# Patient Record
Sex: FEMALE | Race: WHITE | Employment: OTHER | URBAN - METROPOLITAN AREA
[De-identification: names, ages, dates, MRNs, and addresses within clinical notes are randomized per-mention and may not be internally consistent; named-entity substitution may affect disease eponyms.]

---

## 2017-06-29 ENCOUNTER — OFFICE VISIT (OUTPATIENT)
Dept: SURGERY | Age: 32
End: 2017-06-29

## 2017-06-29 VITALS
SYSTOLIC BLOOD PRESSURE: 102 MMHG | WEIGHT: 223 LBS | HEART RATE: 64 BPM | HEIGHT: 65 IN | BODY MASS INDEX: 37.15 KG/M2 | DIASTOLIC BLOOD PRESSURE: 72 MMHG

## 2017-06-29 DIAGNOSIS — E66.09 NON MORBID OBESITY DUE TO EXCESS CALORIES: Primary | ICD-10-CM

## 2017-06-29 DIAGNOSIS — E04.9 GOITER: ICD-10-CM

## 2017-06-29 DIAGNOSIS — G47.33 OBSTRUCTIVE SLEEP APNEA SYNDROME: ICD-10-CM

## 2017-06-29 DIAGNOSIS — E66.09 NON MORBID OBESITY DUE TO EXCESS CALORIES: ICD-10-CM

## 2017-06-29 DIAGNOSIS — E88.81 INSULIN RESISTANCE: ICD-10-CM

## 2017-06-29 DIAGNOSIS — R13.10 DYSPHAGIA, UNSPECIFIED TYPE: ICD-10-CM

## 2017-06-29 LAB
HBA1C MFR BLD: 5.2 % (ref 4.8–5.9)
T4 FREE: 0.78 NG/DL (ref 0.93–1.7)
TSH SERPL DL<=0.05 MIU/L-ACNC: 1.1 UIU/ML (ref 0.27–4.2)

## 2017-06-29 PROCEDURE — 99203 OFFICE O/P NEW LOW 30 MIN: CPT | Performed by: INTERNAL MEDICINE

## 2017-06-29 RX ORDER — FLUOXETINE HYDROCHLORIDE 20 MG/1
60 CAPSULE ORAL DAILY
COMMUNITY
End: 2021-08-09 | Stop reason: SDUPTHER

## 2017-06-29 RX ORDER — PREGABALIN 200 MG/1
200 CAPSULE ORAL 3 TIMES DAILY
COMMUNITY
End: 2021-08-09

## 2017-06-29 RX ORDER — TOPIRAMATE 100 MG/1
100 TABLET, FILM COATED ORAL 2 TIMES DAILY
COMMUNITY
End: 2021-08-09 | Stop reason: SDUPTHER

## 2017-06-29 RX ORDER — GABAPENTIN 800 MG/1
800 TABLET ORAL 4 TIMES DAILY
COMMUNITY
End: 2021-08-09 | Stop reason: SDUPTHER

## 2017-07-01 LAB — THYROID PEROXIDASE (TPO) ABS: 0.6 IU/ML (ref 0–9)

## 2017-07-03 ENCOUNTER — TELEPHONE (OUTPATIENT)
Dept: SURGERY | Age: 32
End: 2017-07-03

## 2017-07-04 ASSESSMENT — ENCOUNTER SYMPTOMS
SWOLLEN GLANDS: 0
RESPIRATORY NEGATIVE: 1
SORE THROAT: 0
EYES NEGATIVE: 1
TROUBLE SWALLOWING: 1

## 2017-07-05 ENCOUNTER — TELEPHONE (OUTPATIENT)
Dept: SURGERY | Age: 32
End: 2017-07-05

## 2017-07-07 ENCOUNTER — HOSPITAL ENCOUNTER (OUTPATIENT)
Dept: ULTRASOUND IMAGING | Age: 32
Discharge: HOME | End: 2017-07-07
Payer: COMMERCIAL

## 2017-07-07 DIAGNOSIS — E04.9 GOITER: ICD-10-CM

## 2017-07-07 PROCEDURE — 76536 US EXAM OF HEAD AND NECK: CPT

## 2017-08-26 ENCOUNTER — HOSPITAL ENCOUNTER (EMERGENCY)
Age: 32
Discharge: HOME | End: 2017-08-26
Attending: EMERGENCY MEDICINE
Payer: COMMERCIAL

## 2017-08-26 VITALS
WEIGHT: 205 LBS | DIASTOLIC BLOOD PRESSURE: 62 MMHG | RESPIRATION RATE: 18 BRPM | HEIGHT: 63 IN | TEMPERATURE: 98 F | OXYGEN SATURATION: 97 % | HEART RATE: 83 BPM | SYSTOLIC BLOOD PRESSURE: 112 MMHG | BODY MASS INDEX: 36.32 KG/M2

## 2017-08-26 DIAGNOSIS — T78.40XA ALLERGIC REACTION, INITIAL ENCOUNTER: Primary | ICD-10-CM

## 2017-08-26 PROCEDURE — 96374 THER/PROPH/DIAG INJ IV PUSH: CPT

## 2017-08-26 PROCEDURE — S0028 INJECTION, FAMOTIDINE, 20 MG: HCPCS

## 2017-08-26 PROCEDURE — 96375 TX/PRO/DX INJ NEW DRUG ADDON: CPT

## 2017-08-26 PROCEDURE — 2500000003 HC RX 250 WO HCPCS

## 2017-08-26 PROCEDURE — 99282 EMERGENCY DEPT VISIT SF MDM: CPT

## 2017-08-26 PROCEDURE — 6360000002 HC RX W HCPCS

## 2017-08-26 PROCEDURE — 2580000003 HC RX 258

## 2017-08-26 RX ORDER — DIPHENHYDRAMINE HCL 25 MG
25 CAPSULE ORAL EVERY 4 HOURS PRN
Qty: 1 CAPSULE | Refills: 0 | Status: SHIPPED | OUTPATIENT
Start: 2017-08-26 | End: 2017-09-05

## 2017-08-26 RX ORDER — DIPHENHYDRAMINE HYDROCHLORIDE 50 MG/ML
25 INJECTION INTRAMUSCULAR; INTRAVENOUS ONCE
Status: COMPLETED | OUTPATIENT
Start: 2017-08-26 | End: 2017-08-26

## 2017-08-26 RX ORDER — METHYLPREDNISOLONE 4 MG/1
TABLET ORAL
Qty: 1 KIT | Refills: 0 | Status: SHIPPED | OUTPATIENT
Start: 2017-08-26 | End: 2017-09-01

## 2017-08-26 RX ORDER — SODIUM CHLORIDE 9 MG/ML
INJECTION, SOLUTION INTRAVENOUS
Status: COMPLETED
Start: 2017-08-26 | End: 2017-08-26

## 2017-08-26 RX ORDER — FAMOTIDINE 20 MG/1
20 TABLET, FILM COATED ORAL 2 TIMES DAILY
Qty: 20 TABLET | Refills: 0 | Status: SHIPPED | OUTPATIENT
Start: 2017-08-26 | End: 2021-08-09 | Stop reason: SDUPTHER

## 2017-08-26 RX ORDER — DIPHENHYDRAMINE HYDROCHLORIDE 50 MG/ML
INJECTION INTRAMUSCULAR; INTRAVENOUS
Status: COMPLETED
Start: 2017-08-26 | End: 2017-08-26

## 2017-08-26 RX ORDER — METHYLPREDNISOLONE SODIUM SUCCINATE 125 MG/2ML
INJECTION, POWDER, LYOPHILIZED, FOR SOLUTION INTRAMUSCULAR; INTRAVENOUS
Status: COMPLETED
Start: 2017-08-26 | End: 2017-08-26

## 2017-08-26 RX ORDER — METHYLPREDNISOLONE SODIUM SUCCINATE 125 MG/2ML
125 INJECTION, POWDER, LYOPHILIZED, FOR SOLUTION INTRAMUSCULAR; INTRAVENOUS ONCE
Status: COMPLETED | OUTPATIENT
Start: 2017-08-26 | End: 2017-08-26

## 2017-08-26 RX ADMIN — METHYLPREDNISOLONE SODIUM SUCCINATE 125 MG: 125 INJECTION, POWDER, LYOPHILIZED, FOR SOLUTION INTRAMUSCULAR; INTRAVENOUS at 15:21

## 2017-08-26 RX ADMIN — SODIUM CHLORIDE 1000 ML: 9 INJECTION, SOLUTION INTRAVENOUS at 15:22

## 2017-08-26 RX ADMIN — DIPHENHYDRAMINE HYDROCHLORIDE 25 MG: 50 INJECTION, SOLUTION INTRAMUSCULAR; INTRAVENOUS at 15:23

## 2017-08-26 RX ADMIN — METHYLPREDNISOLONE SODIUM SUCCINATE 125 MG: 125 INJECTION, POWDER, FOR SOLUTION INTRAMUSCULAR; INTRAVENOUS at 15:21

## 2017-08-26 RX ADMIN — FAMOTIDINE 20 MG: 10 INJECTION, SOLUTION INTRAVENOUS at 15:22

## 2017-08-26 RX ADMIN — DIPHENHYDRAMINE HYDROCHLORIDE 25 MG: 50 INJECTION INTRAMUSCULAR; INTRAVENOUS at 15:23

## 2017-08-26 ASSESSMENT — ENCOUNTER SYMPTOMS
FACIAL SWELLING: 0
COLOR CHANGE: 1
VOMITING: 0
EYE ITCHING: 0
COUGH: 0
STRIDOR: 0
ABDOMINAL DISTENTION: 0
ANAL BLEEDING: 0
EYE REDNESS: 0
EYE PAIN: 0
CHEST TIGHTNESS: 0
WHEEZING: 0
SORE THROAT: 0
TROUBLE SWALLOWING: 0
SHORTNESS OF BREATH: 0
DIARRHEA: 0
BACK PAIN: 0
VOICE CHANGE: 0
PHOTOPHOBIA: 0
CONSTIPATION: 0
SINUS PRESSURE: 0
RHINORRHEA: 0
ABDOMINAL PAIN: 0
EYE DISCHARGE: 0
CHOKING: 0
NAUSEA: 0
BLOOD IN STOOL: 0

## 2018-01-30 ENCOUNTER — HOSPITAL ENCOUNTER (EMERGENCY)
Age: 33
Discharge: HOME | End: 2018-01-30
Attending: EMERGENCY MEDICINE
Payer: COMMERCIAL

## 2018-01-30 ENCOUNTER — APPOINTMENT (OUTPATIENT)
Dept: CT IMAGING | Age: 33
End: 2018-01-30
Payer: COMMERCIAL

## 2018-01-30 VITALS
DIASTOLIC BLOOD PRESSURE: 72 MMHG | OXYGEN SATURATION: 98 % | RESPIRATION RATE: 14 BRPM | HEIGHT: 63 IN | BODY MASS INDEX: 35.61 KG/M2 | HEART RATE: 77 BPM | WEIGHT: 201 LBS | TEMPERATURE: 97.9 F | SYSTOLIC BLOOD PRESSURE: 101 MMHG

## 2018-01-30 DIAGNOSIS — R51.9 NONINTRACTABLE EPISODIC HEADACHE, UNSPECIFIED HEADACHE TYPE: Primary | ICD-10-CM

## 2018-01-30 DIAGNOSIS — K59.00 CONSTIPATION, UNSPECIFIED CONSTIPATION TYPE: ICD-10-CM

## 2018-01-30 PROCEDURE — 70450 CT HEAD/BRAIN W/O DYE: CPT

## 2018-01-30 PROCEDURE — 99284 EMERGENCY DEPT VISIT MOD MDM: CPT

## 2018-01-30 RX ORDER — ORPHENADRINE CITRATE 100 MG/1
100 TABLET, EXTENDED RELEASE ORAL 2 TIMES DAILY
Qty: 20 TABLET | Refills: 0 | Status: SHIPPED | OUTPATIENT
Start: 2018-01-30 | End: 2018-02-09

## 2018-01-30 RX ORDER — PROPRANOLOL HYDROCHLORIDE 80 MG/1
80 TABLET ORAL DAILY
COMMUNITY
End: 2021-08-09

## 2018-01-30 RX ORDER — PROMETHAZINE HYDROCHLORIDE 25 MG/1
25 TABLET ORAL EVERY 6 HOURS PRN
COMMUNITY
End: 2021-08-09 | Stop reason: SDUPTHER

## 2018-01-30 ASSESSMENT — PAIN DESCRIPTION - FREQUENCY: FREQUENCY: CONTINUOUS

## 2018-01-30 ASSESSMENT — PAIN DESCRIPTION - ORIENTATION: ORIENTATION: POSTERIOR;RIGHT

## 2018-01-30 ASSESSMENT — ENCOUNTER SYMPTOMS
NAUSEA: 1
VOMITING: 0
EYE PAIN: 0
CHEST TIGHTNESS: 0
ABDOMINAL PAIN: 0
SORE THROAT: 0
SHORTNESS OF BREATH: 0
CONSTIPATION: 1

## 2018-01-30 ASSESSMENT — PAIN DESCRIPTION - DESCRIPTORS: DESCRIPTORS: SHARP;THROBBING;ACHING

## 2018-01-30 ASSESSMENT — PAIN DESCRIPTION - ONSET: ONSET: AWAKENED FROM SLEEP

## 2018-01-30 ASSESSMENT — PAIN DESCRIPTION - PAIN TYPE: TYPE: ACUTE PAIN

## 2018-01-30 ASSESSMENT — PAIN SCALES - GENERAL: PAINLEVEL_OUTOF10: 5

## 2018-01-30 ASSESSMENT — PAIN DESCRIPTION - LOCATION: LOCATION: HEAD

## 2018-01-30 NOTE — ED PROVIDER NOTES
[address] [facility] ED  eMERGENCY dEPARTMENT eNCOUnter      Pt Name: Sujata Quiñones  MRN: [id number]  [place] 1985  Date of evaluation: 1/30/2018  Provider: Kate W[address]       Chief Complaint   Patient presents with    Headache     to right occiput woke up with it today tried excedrin and ibuprofen. Pt also dizzy and nauseated. Pt said it feels like she has electric shocks going through her whole body for 2 days.  Constipation     for a month has tried miralax without results         HISTORY OF PRESENT ILLNESS   (Location/Symptom, Timing/Onset, Context/Setting, Quality, Duration, Modifying Factors, Severity)  Note limiting factors. Sujata Quiñones is a 27 y.o. female who presents to the emergency department . She presents with pain in her right occiput. From that area she gets electric shocks to go into her body. That is making her very worried and upset. At times she feels a little lightheaded. Patient does get a lot of headaches and does take Excedrin which never helps. She was put on propranolol but that does not prevent her from getting headaches either. Additionally she states that she has not had a bowel movement in many days. She has taken multiple doses of MiraLAX without results. She is not vomiting but at times feels a little bit nauseated. No abdominal pain. HPI    Nursing Notes were reviewed. REVIEW OF SYSTEMS    (2-9 systems for level 4, 10 or more for level 5)     Review of Systems   Constitutional: Negative for activity change, appetite change, fatigue and fever. HENT: Negative for congestion and sore throat. Eyes: Negative for pain and visual disturbance. Respiratory: Negative for chest tightness and shortness of breath. Cardiovascular: Negative for chest pain. Gastrointestinal: Positive for constipation and nausea. Negative for abdominal pain and vomiting. Endocrine: Negative for polydipsia.    Genitourinary: Negative for flank

## 2021-08-09 ENCOUNTER — OFFICE VISIT (OUTPATIENT)
Dept: FAMILY MEDICINE CLINIC | Age: 36
End: 2021-08-09
Payer: MEDICARE

## 2021-08-09 VITALS
BODY MASS INDEX: 44.16 KG/M2 | HEIGHT: 62 IN | HEART RATE: 98 BPM | DIASTOLIC BLOOD PRESSURE: 70 MMHG | TEMPERATURE: 98.7 F | SYSTOLIC BLOOD PRESSURE: 130 MMHG | RESPIRATION RATE: 20 BRPM | WEIGHT: 240 LBS | OXYGEN SATURATION: 98 %

## 2021-08-09 DIAGNOSIS — R63.5 WEIGHT GAIN: ICD-10-CM

## 2021-08-09 DIAGNOSIS — G62.9 NEUROPATHY: ICD-10-CM

## 2021-08-09 DIAGNOSIS — I83.813 VARICOSE VEINS OF BOTH LOWER EXTREMITIES WITH PAIN: ICD-10-CM

## 2021-08-09 DIAGNOSIS — G60.0 CMT (CHARCOT-MARIE-TOOTH DISEASE): Primary | ICD-10-CM

## 2021-08-09 DIAGNOSIS — K29.70 GASTRITIS WITHOUT BLEEDING, UNSPECIFIED CHRONICITY, UNSPECIFIED GASTRITIS TYPE: ICD-10-CM

## 2021-08-09 DIAGNOSIS — G43.101 MIGRAINE WITH AURA AND WITH STATUS MIGRAINOSUS, NOT INTRACTABLE: ICD-10-CM

## 2021-08-09 DIAGNOSIS — R53.82 CHRONIC FATIGUE: ICD-10-CM

## 2021-08-09 DIAGNOSIS — R79.9 ABNORMAL FINDING OF BLOOD CHEMISTRY, UNSPECIFIED: ICD-10-CM

## 2021-08-09 DIAGNOSIS — F32.A MOOD DISORDER OF DEPRESSED TYPE: ICD-10-CM

## 2021-08-09 PROCEDURE — 99204 OFFICE O/P NEW MOD 45 MIN: CPT | Performed by: PHYSICIAN ASSISTANT

## 2021-08-09 RX ORDER — GABAPENTIN 800 MG/1
800 TABLET ORAL 4 TIMES DAILY
Qty: 120 TABLET | Refills: 1 | Status: SHIPPED | OUTPATIENT
Start: 2021-08-09 | End: 2021-09-30 | Stop reason: SDUPTHER

## 2021-08-09 RX ORDER — PROMETHAZINE HYDROCHLORIDE 25 MG/1
25 TABLET ORAL EVERY 6 HOURS PRN
Qty: 90 TABLET | Refills: 1 | Status: SHIPPED | OUTPATIENT
Start: 2021-08-09 | End: 2021-09-30 | Stop reason: SDUPTHER

## 2021-08-09 RX ORDER — FLUOXETINE HYDROCHLORIDE 20 MG/1
CAPSULE ORAL
Qty: 90 CAPSULE | Refills: 1 | Status: SHIPPED | OUTPATIENT
Start: 2021-08-09 | End: 2021-09-30 | Stop reason: SDUPTHER

## 2021-08-09 RX ORDER — FAMOTIDINE 20 MG/1
20 TABLET, FILM COATED ORAL PRN
Qty: 90 TABLET | Refills: 1 | Status: SHIPPED | OUTPATIENT
Start: 2021-08-09

## 2021-08-09 RX ORDER — TOPIRAMATE 100 MG/1
100 TABLET, FILM COATED ORAL 2 TIMES DAILY
Qty: 60 TABLET | Refills: 5 | Status: SHIPPED | OUTPATIENT
Start: 2021-08-09 | End: 2022-01-24 | Stop reason: SDUPTHER

## 2021-08-09 RX ORDER — RIMEGEPANT SULFATE 75 MG/75MG
TABLET, ORALLY DISINTEGRATING ORAL
Qty: 18 TABLET | Refills: 11 | Status: SHIPPED | OUTPATIENT
Start: 2021-08-09 | End: 2021-11-19

## 2021-08-09 SDOH — ECONOMIC STABILITY: FOOD INSECURITY: WITHIN THE PAST 12 MONTHS, THE FOOD YOU BOUGHT JUST DIDN'T LAST AND YOU DIDN'T HAVE MONEY TO GET MORE.: OFTEN TRUE

## 2021-08-09 SDOH — ECONOMIC STABILITY: FOOD INSECURITY: WITHIN THE PAST 12 MONTHS, YOU WORRIED THAT YOUR FOOD WOULD RUN OUT BEFORE YOU GOT MONEY TO BUY MORE.: OFTEN TRUE

## 2021-08-09 ASSESSMENT — ENCOUNTER SYMPTOMS
TROUBLE SWALLOWING: 0
NAUSEA: 1
COLOR CHANGE: 0
RECTAL PAIN: 0
WHEEZING: 0
SHORTNESS OF BREATH: 0
ABDOMINAL DISTENTION: 0
ABDOMINAL PAIN: 1
CONSTIPATION: 0
BACK PAIN: 1
CHEST TIGHTNESS: 1
DIARRHEA: 0
COUGH: 0
BLOOD IN STOOL: 0

## 2021-08-09 ASSESSMENT — PATIENT HEALTH QUESTIONNAIRE - PHQ9
10. IF YOU CHECKED OFF ANY PROBLEMS, HOW DIFFICULT HAVE THESE PROBLEMS MADE IT FOR YOU TO DO YOUR WORK, TAKE CARE OF THINGS AT HOME, OR GET ALONG WITH OTHER PEOPLE: 3
4. FEELING TIRED OR HAVING LITTLE ENERGY: 3
SUM OF ALL RESPONSES TO PHQ QUESTIONS 1-9: 20
3. TROUBLE FALLING OR STAYING ASLEEP: 2
5. POOR APPETITE OR OVEREATING: 3
2. FEELING DOWN, DEPRESSED OR HOPELESS: 3
SUM OF ALL RESPONSES TO PHQ QUESTIONS 1-9: 20
6. FEELING BAD ABOUT YOURSELF - OR THAT YOU ARE A FAILURE OR HAVE LET YOURSELF OR YOUR FAMILY DOWN: 3
8. MOVING OR SPEAKING SO SLOWLY THAT OTHER PEOPLE COULD HAVE NOTICED. OR THE OPPOSITE, BEING SO FIGETY OR RESTLESS THAT YOU HAVE BEEN MOVING AROUND A LOT MORE THAN USUAL: 0
SUM OF ALL RESPONSES TO PHQ9 QUESTIONS 1 & 2: 6
9. THOUGHTS THAT YOU WOULD BE BETTER OFF DEAD, OR OF HURTING YOURSELF: 0
SUM OF ALL RESPONSES TO PHQ QUESTIONS 1-9: 20
7. TROUBLE CONCENTRATING ON THINGS, SUCH AS READING THE NEWSPAPER OR WATCHING TELEVISION: 3
1. LITTLE INTEREST OR PLEASURE IN DOING THINGS: 3

## 2021-08-09 ASSESSMENT — COLUMBIA-SUICIDE SEVERITY RATING SCALE - C-SSRS
2. HAVE YOU ACTUALLY HAD ANY THOUGHTS OF KILLING YOURSELF?: NO
6. HAVE YOU EVER DONE ANYTHING, STARTED TO DO ANYTHING, OR PREPARED TO DO ANYTHING TO END YOUR LIFE?: NO
1. WITHIN THE PAST MONTH, HAVE YOU WISHED YOU WERE DEAD OR WISHED YOU COULD GO TO SLEEP AND NOT WAKE UP?: NO

## 2021-08-09 ASSESSMENT — SOCIAL DETERMINANTS OF HEALTH (SDOH): HOW HARD IS IT FOR YOU TO PAY FOR THE VERY BASICS LIKE FOOD, HOUSING, MEDICAL CARE, AND HEATING?: VERY HARD
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
## 2021-09-07 ENCOUNTER — OFFICE VISIT (OUTPATIENT)
Dept: NEUROLOGY | Age: 36
End: 2021-09-07
Payer: MEDICARE

## 2021-09-07 VITALS
DIASTOLIC BLOOD PRESSURE: 88 MMHG | BODY MASS INDEX: 43.86 KG/M2 | WEIGHT: 239.8 LBS | SYSTOLIC BLOOD PRESSURE: 138 MMHG | HEART RATE: 100 BPM

## 2021-09-07 DIAGNOSIS — I83.813 VARICOSE VEINS OF BOTH LOWER EXTREMITIES WITH PAIN: ICD-10-CM

## 2021-09-07 DIAGNOSIS — G62.9 NEUROPATHY: ICD-10-CM

## 2021-09-07 DIAGNOSIS — G59 MONONEUROPATHY DUE TO UNDERLYING DISEASE: ICD-10-CM

## 2021-09-07 DIAGNOSIS — D17.79 LIPOMA OF OTHER SPECIFIED SITES: ICD-10-CM

## 2021-09-07 DIAGNOSIS — G60.0 CMT (CHARCOT-MARIE-TOOTH DISEASE): Primary | ICD-10-CM

## 2021-09-07 PROBLEM — D17.9 LIPOMA: Status: ACTIVE | Noted: 2021-09-07

## 2021-09-07 PROCEDURE — 99204 OFFICE O/P NEW MOD 45 MIN: CPT | Performed by: PSYCHIATRY & NEUROLOGY

## 2021-09-07 ASSESSMENT — ENCOUNTER SYMPTOMS
VOMITING: 0
BACK PAIN: 0
TROUBLE SWALLOWING: 0
PHOTOPHOBIA: 0
SHORTNESS OF BREATH: 0
NAUSEA: 0
COLOR CHANGE: 0
CHOKING: 0

## 2021-09-07 NOTE — PROGRESS NOTES
Subjective:      Patient ID: Lily Pham is a 43 y.o. female who presents today for:  Chief Complaint   Patient presents with    New Patient     Pt states that CMT runs in family. She states that she has lumps in back of leg and in her back. She states that if she is hot she is in horrible pain. She has a hard time closing her hands all the way as well. HPI 68-year-old right-handed female with a history of migraine headaches. Patient is here for evaluation of CMTS patient has a family history of the same. Reported that she was diagnosed with this a few years ago when she was on Lyrica and gabapentin together. She is now only on gabapentin. She is here as she feels lumps in her legs and she has considerable pain in her legs as well. She denies any back pain. She has no major weakness but she is not able stand on her toes. Patient also has migraine headaches and she has been tried on Maxalt Imitrex and many other medication she is already on topiramate. She was given Nurtec though this was not covered by [employer]. She is not been on any preventive other therapies. Her frequency is several headache days a month. There is associated nausea and photophobia.   Denies  any neck pain    Past Medical History:   Diagnosis Date    Arthritis     Bipolar 1 disorder ([address]     Carpal tunnel syndrome     CMT (Charcot-Melany-Tooth disease)     Depression     Headache      Past Surgical History:   Procedure Laterality Date    HYSTERECTOMY       Social History     Socioeconomic History    Marital status: Legally      Spouse name: Not on file    Number of children: Not on file    Years of education: Not on file    Highest education level: Not on file   Occupational History    Not on file   Tobacco Use    Smoking status: Current Every Day Smoker    Smokeless tobacco: Never Used    Tobacco comment: actively trying to quit   Substance and Sexual Activity    Alcohol use: No    (NURTEC) 75 MG TBDP Take 1 ODT po every other day for prevention of migraine HAs. 18 tablet 11     No current facility-administered medications for this visit. Review of Systems   Constitutional: Negative for fever. HENT: Negative for ear pain, tinnitus and trouble swallowing. Eyes: Negative for photophobia and visual disturbance. Respiratory: Negative for choking and shortness of breath. Cardiovascular: Negative for chest pain and palpitations. Gastrointestinal: Negative for nausea and vomiting. Musculoskeletal: Negative for back pain, gait problem, joint swelling, myalgias, neck pain and neck stiffness. Skin: Negative for color change. Allergic/Immunologic: Negative for food allergies. Neurological: Negative for dizziness, tremors, seizures, syncope, facial asymmetry, speech difficulty, weakness, light-headedness, numbness and headaches. Psychiatric/Behavioral: Negative for behavioral problems, confusion, hallucinations and sleep disturbance. Objective:   /88 (Site: Left Upper Arm, Position: Sitting, Cuff Size: Large Adult)   Pulse 100   Wt 239 lb 12.8 oz (108.8 kg)   BMI 43.86 kg/m²     Physical Exam  Vitals reviewed. Eyes:      Pupils: Pupils are equal, round, and reactive to light. Cardiovascular:      Rate and Rhythm: Normal rate and regular rhythm. Heart sounds: No murmur heard. Pulmonary:      Effort: Pulmonary effort is normal.      Breath sounds: Normal breath sounds. Abdominal:      General: Bowel sounds are normal.   Musculoskeletal:         General: Normal range of motion. Cervical back: Normal range of motion. Skin:     General: Skin is warm. Neurological:      Mental Status: She is alert and oriented to person, place, and time. Cranial Nerves: No cranial nerve deficit. Sensory: No sensory deficit. Motor: No abnormal muscle tone.       Coordination: Coordination normal.      Deep Tendon Reflexes: Reflexes are normal and symmetric. Babinski sign absent on the right side. Babinski sign absent on the left side. Psychiatric:         Mood and Affect: Mood normal.         CT Head WO Contrast    Result Date: 1/30/2018  EXAMINATION: CT HEAD WO CONTRAST  DATE AND TIME:1/30/2018 5:15 PM CLINICAL HISTORY: Severe headache.  ha  COMPARISON: None TECHNIQUE:Axial CT from skull base to vertex without  contrast..  All CT scans at this facility use dose modulation, iterative reconstruction, and/or weight based dosing when appropriate to reduce radiation dose to as low as reasonably achievable. FINDINGS:  Ventricles are normal in size and position. Sulci are appropriate for age                      No abnormal parenchymal densities. There is no parenchymal mass, or mass effect, There is no acute parenchymal hemorrhage or extra-axial fluid. The bony calvarium and skull base are normal.    The visualized paranasal sinuses and mastoids are clear. NO ACUTE INTRACRANIAL PATHOLOGY.        Lab Results   Component Value Date    WBC 14.2 09/21/2014    RBC 3.67 09/21/2014    HGB 10.5 09/21/2014    HCT 31.5 09/21/2014    MCV 85.9 09/21/2014    MCH 28.6 09/21/2014    MCHC 33.3 09/21/2014    RDW 14.2 09/21/2014     09/22/2014    MPV 7.5 09/21/2014     Lab Results   Component Value Date     09/21/2014    K 3.8 09/21/2014     09/21/2014    CO2 26 09/21/2014    BUN 15 09/21/2014    CREATININE 0.58 09/21/2014    GFRAA >60.0 09/21/2014    LABGLOM >60.0 09/21/2014    GLUCOSE 115 09/21/2014    PROT 6.7 09/19/2014    LABALBU 4.4 09/19/2014    CALCIUM 9.0 09/21/2014    BILITOT 0.2 09/19/2014    ALKPHOS 72 09/19/2014    AST 25 09/19/2014    ALT 14 09/19/2014     No results found for: PROTIME, INR  Lab Results   Component Value Date    TSH 1.100 06/29/2017     No results found for: TRIG, HDL, LDLCALC, LDLDIRECT, LABVLDL  Lab Results   Component Value Date    LABAMPH Neg 09/19/2014    BARBSCNU Neg 09/19/2014    LABBENZ Neg 09/19/2014 OPIATESCREENURINE Neg 09/19/2014    PHENCYCLIDINESCREENURINE Neg 09/19/2014    ETOH <10 07/25/2014     No results found for: LITHIUM, DILFRTOT, VALPROATE    Assessment:       Diagnosis Orders   1. CMT (Charcot-Melany-Tooth disease)     2. Lipoma of other specified sites  CK    Lactic Acid, Plasma    Aldolase    CT TIBIA FIBULA LEFT WO CONTRAST    CT TIBIA FIBULA RIGHT WO CONTRAST   3. Mononeuropathy due to underlying disease  EMG   4. Neuropathy     5. Varicose veins of both lower extremities with pain     Charcot-Melany-Tooth syndrome with multiple family members with the same disorder. Patient was diagnosed a few years ago. Patient retains all her reflexes and therefore this is questionable. Patient has 2 brothers and 2 sisters 3 of them already had diagnosed her  if this is autosomal dominant and she could have missed this. Again the penetrance of this disorder is variable and she can have minor physical findings. I recommended a EMG nerve conduction study for now to see if this truly is a Charcot-Melany-Tooth syndrome. Patient is here for some lumps in the legs which I was not able to palpate but may be lipomas. CT of the legs is therefore recommended. Patient is under pain management with gabapentin. He was on Lyrica which I offered again but she cannot be on both gabapentin and Lyrica. Lyrica comes with some price of weight gain and swelling and she would like to continue with gabapentin. In the event that she has increasing pain she require pain management. She is already on Prozac and therefore we were not able to consider Cymbalta. We will arrange for CPK aldolase levels to see if this is muscle disease. Patient has migraine headaches which are not responsive to Imitrex or Maxalt and she is on topiramate. Recommended that we consider for Ajovy as a preventive medication and then Nurtec for rescue if covered by the insurance company given that she has not responded to triptan's. We will see her back in 3 months      Plan:      Orders Placed This Encounter   Procedures    CT TIBIA FIBULA LEFT WO CONTRAST     Standing Status:   Future     Standing Expiration Date:   9/7/2022     Order Specific Question:   Reason for exam:     Answer:   swelling    CT TIBIA FIBULA RIGHT WO CONTRAST     Standing Status:   Future     Standing Expiration Date:   9/7/2022     Order Specific Question:   Reason for exam:     Answer:   lipoma    CK     Standing Status:   Future     Standing Expiration Date:   9/7/2022    Lactic Acid, Plasma     Standing Status:   Future     Standing Expiration Date:   9/7/2022    Aldolase     Standing Status:   Future     Standing Expiration Date:   9/7/2022    EMG     Standing Status:   Future     Standing Expiration Date:   11/6/2021     Order Specific Question:   Which body part? Answer:   PN/  CMT lowers     No orders of the defined types were placed in this encounter. No follow-ups on file.       Vanesa Ta MD

## 2021-09-14 RX ORDER — RIMEGEPANT SULFATE 75 MG/75MG
75 TABLET, ORALLY DISINTEGRATING ORAL PRN
Qty: 8 TABLET | Refills: 5 | Status: SHIPPED | OUTPATIENT
Start: 2021-09-14 | End: 2021-11-19

## 2021-09-14 RX ORDER — FREMANEZUMAB-VFRM 225 MG/1.5ML
1.5 INJECTION SUBCUTANEOUS
Qty: 1.5 ML | Refills: 5 | Status: SHIPPED | OUTPATIENT
Start: 2021-09-14 | End: 2021-11-19

## 2021-09-20 ENCOUNTER — TELEPHONE (OUTPATIENT)
Dept: NEUROLOGY | Age: 36
End: 2021-09-20

## 2021-09-20 NOTE — TELEPHONE ENCOUNTER
Pts insurance has denied both nurtec and ajovy for patient. I will work on getting nurtec approved with an appeal.      Soumya Hung was deined and they states that she has to be on aimovig first and fail it. Would you like to try her on this?

## 2021-09-22 ENCOUNTER — TELEPHONE (OUTPATIENT)
Dept: NEUROLOGY | Age: 36
End: 2021-09-22

## 2021-09-22 RX ORDER — ERENUMAB-AOOE 140 MG/ML
1 INJECTION, SOLUTION SUBCUTANEOUS
Qty: 1 ML | Refills: 5 | Status: SHIPPED | OUTPATIENT
Start: 2021-09-22 | End: 2021-10-22

## 2021-09-22 NOTE — TELEPHONE ENCOUNTER
lmovm for patient to call back.  Sent aimovig script in another encounter and submitted pa in cover my meds

## 2021-09-23 NOTE — TELEPHONE ENCOUNTER
Approved. This drug has been approved under the Member's Medicare Part D benefit. Approved quantity: 1 milliliters per 30 day(s). You may fill up to a 90 day supply except for those on Specialty Tier 5, which can be filled up to a 30 day supply. Please call the pharmacy to process the prescription claim.

## 2021-09-30 ENCOUNTER — TELEPHONE (OUTPATIENT)
Dept: FAMILY MEDICINE CLINIC | Age: 36
End: 2021-09-30

## 2021-09-30 DIAGNOSIS — G62.9 NEUROPATHY: ICD-10-CM

## 2021-09-30 DIAGNOSIS — G43.101 MIGRAINE WITH AURA AND WITH STATUS MIGRAINOSUS, NOT INTRACTABLE: ICD-10-CM

## 2021-09-30 DIAGNOSIS — G60.0 CMT (CHARCOT-MARIE-TOOTH DISEASE): ICD-10-CM

## 2021-09-30 DIAGNOSIS — F32.A MOOD DISORDER OF DEPRESSED TYPE: ICD-10-CM

## 2021-09-30 RX ORDER — PROMETHAZINE HYDROCHLORIDE 25 MG/1
25 TABLET ORAL EVERY 6 HOURS PRN
Qty: 90 TABLET | Refills: 1 | Status: SHIPPED | OUTPATIENT
Start: 2021-09-30

## 2021-09-30 RX ORDER — GABAPENTIN 800 MG/1
800 TABLET ORAL 4 TIMES DAILY
Qty: 120 TABLET | Refills: 1 | Status: SHIPPED | OUTPATIENT
Start: 2021-09-30 | End: 2021-11-26 | Stop reason: SDUPTHER

## 2021-09-30 RX ORDER — FLUOXETINE HYDROCHLORIDE 20 MG/1
CAPSULE ORAL
Qty: 90 CAPSULE | Refills: 1 | Status: SHIPPED | OUTPATIENT
Start: 2021-09-30 | End: 2022-01-24 | Stop reason: SDUPTHER

## 2021-09-30 NOTE — TELEPHONE ENCOUNTER
Patient called in and says she is trying to quit smoking. she is wondering if she could get an rx for gum or patches to help her quit.     Please advise and thank you

## 2021-09-30 NOTE — TELEPHONE ENCOUNTER
Rx request   Requested Prescriptions     Pending Prescriptions Disp Refills    FLUoxetine (PROZAC) 20 MG capsule 90 capsule 1     Sig: Start back with 1 capsule daily by mouth, may titrate up 20 mg every 2 weeks as tolerated to 60 mg.    gabapentin (NEURONTIN) 800 MG tablet 120 tablet 1     Sig: Take 1 tablet by mouth 4 times daily for 90 days.     promethazine (PHENERGAN) 25 MG tablet 90 tablet 1     Sig: Take 1 tablet by mouth every 6 hours as needed for Nausea Take 25 mg by mouth every 6 hours as needed for Nausea     LOV 8/9/2021  Next Visit Date:  Future Appointments   Date Time Provider Carlos Gutierrez   10/4/2021  1:00 PM [facility] ROOM [facility]   10/4/2021  1:30 PM [facility] RO[facility] [place]   10/27/2021 11:00 AM Chacho Almodovar  [address]   12/28/2021  2:45 PM Chacho Almodovar  [address]

## 2021-10-01 DIAGNOSIS — Z72.0 SMOKING TRYING TO QUIT: Primary | ICD-10-CM

## 2021-10-01 RX ORDER — NICOTINE 21 MG/24HR
1 PATCH, TRANSDERMAL 24 HOURS TRANSDERMAL DAILY
Qty: 14 PATCH | Refills: 5 | Status: SHIPPED | OUTPATIENT
Start: 2021-10-01 | End: 2022-02-16

## 2021-10-04 ENCOUNTER — HOSPITAL ENCOUNTER (OUTPATIENT)
Dept: CT IMAGING | Age: 36
Discharge: HOME | End: 2021-10-06
Payer: MEDICARE

## 2021-10-04 DIAGNOSIS — D17.79 LIPOMA OF OTHER SPECIFIED SITES: ICD-10-CM

## 2021-10-04 PROCEDURE — 73700 CT LOWER EXTREMITY W/O DYE: CPT

## 2021-10-14 ENCOUNTER — TELEPHONE (OUTPATIENT)
Dept: NEUROLOGY | Age: 36
End: 2021-10-14

## 2021-10-14 NOTE — TELEPHONE ENCOUNTER
Gave patient results of CT of her legs which were normal and she states that she has lumpson the back of her legs and they hurt, She dose not know what to do.  She is scheduled for EMG on 10/27/2021    Please advised     rylie

## 2021-10-29 ENCOUNTER — HOSPITAL ENCOUNTER (OUTPATIENT)
Dept: LAB | Age: 36
Discharge: HOME | End: 2021-10-29
Payer: MEDICARE

## 2021-11-04 ENCOUNTER — TELEPHONE (OUTPATIENT)
Dept: FAMILY MEDICINE CLINIC | Age: 36
End: 2021-11-04

## 2021-11-04 NOTE — TELEPHONE ENCOUNTER
Patient is scheduled for 11/9 w/
No  Have you had close contact with someone with COVID-19 in the last 14 days? No  (Service Expert  click yes below to proceed with [email or web address] As Usual   Scheduling)?  Yes

## 2021-11-18 ENCOUNTER — TELEPHONE (OUTPATIENT)
Dept: FAMILY MEDICINE CLINIC | Age: 36
End: 2021-11-18

## 2021-11-18 NOTE — TELEPHONE ENCOUNTER
lvm for patient to call back and schedule with Yesenia More
with someone with COVID-19 in the last 14 days? No  (Service Expert  click yes below to proceed with [email or web address] As Usual   Scheduling)?  Yes

## 2021-11-19 ENCOUNTER — OFFICE VISIT (OUTPATIENT)
Dept: FAMILY MEDICINE CLINIC | Age: 36
End: 2021-11-19
Payer: MEDICARE

## 2021-11-19 VITALS
HEART RATE: 108 BPM | OXYGEN SATURATION: 98 % | BODY MASS INDEX: 46.56 KG/M2 | HEIGHT: 62 IN | WEIGHT: 253 LBS | RESPIRATION RATE: 18 BRPM | SYSTOLIC BLOOD PRESSURE: 120 MMHG | DIASTOLIC BLOOD PRESSURE: 82 MMHG

## 2021-11-19 DIAGNOSIS — D22.9 FLESHY SKIN MOLE: ICD-10-CM

## 2021-11-19 DIAGNOSIS — Z83.49 FAMILY HISTORY OF THYROID DISEASE: ICD-10-CM

## 2021-11-19 DIAGNOSIS — M54.50 ACUTE RIGHT-SIDED LOW BACK PAIN WITHOUT SCIATICA: ICD-10-CM

## 2021-11-19 DIAGNOSIS — N64.4 MASTALGIA IN FEMALE: Primary | ICD-10-CM

## 2021-11-19 DIAGNOSIS — L91.8 SKIN TAG: ICD-10-CM

## 2021-11-19 DIAGNOSIS — M79.89 SOFT TISSUE MASS: ICD-10-CM

## 2021-11-19 PROCEDURE — 99214 OFFICE O/P EST MOD 30 MIN: CPT | Performed by: INTERNAL MEDICINE

## 2021-11-19 RX ORDER — DANAZOL 50 MG/1
50 CAPSULE ORAL 2 TIMES DAILY
Qty: 14 CAPSULE | Refills: 0 | Status: SHIPPED | OUTPATIENT
Start: 2021-11-19 | End: 2022-03-21

## 2021-11-19 RX ORDER — MELOXICAM 7.5 MG/1
15 TABLET ORAL DAILY
Qty: 28 TABLET | Refills: 0 | Status: SHIPPED | OUTPATIENT
Start: 2021-11-19 | End: 2022-03-21

## 2021-11-19 ASSESSMENT — ENCOUNTER SYMPTOMS
COUGH: 0
SHORTNESS OF BREATH: 0
BACK PAIN: 1
CHEST TIGHTNESS: 0
WHEEZING: 0
NAUSEA: 0
VOMITING: 0

## 2021-11-19 NOTE — PROGRESS NOTES
Subjective:      Patient ID: Lamont [facility] is a 43 y.o. female who presents today for:  Chief Complaint   Patient presents with    Breast Pain     soreness in breasts, new appearance of moles that are growing in size    Mass     mass/lump under buttocks left, doubling in size onset 2005. HPI  Patient being seen today with multiple complaints. Sad and tearful during encounter. 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 Patient is particularly upset about her consistent weight gain; states she has gained about 100lbs over an unspecified period. Finds exercise difficult due to her diagnosis of CMT, states she's constantly in pain. No recent changes to diet. Patient also reports bilateral breast pain and tenderness over the last few weeks. Unclear if related to cycle or not. Of note, patient underwent a hysterectomy at age 30 but had preservation of one ovary. No breast lump(s) have been appreciated. Also denies associated skin changes. No preceding trauma. Patient also reports a large skin tag over the left breast as well as 3 moles and would like them excised. She reports gradual increase in size of moles with time. a mass/lump under the left buttock is also reported. Mass has gradually increased in size over many years. Recurrent abrasion due to position. However she denies tenderness, bleeding or drainage from the mass. Discomfort is more of a cosmetic nature.         Past Medical History:   Diagnosis Date    Arthritis     Bipolar 1 disorder (HCC)     Carpal tunnel syndrome     CMT (Charcot-Melany-Tooth disease)     Depression     Headache      Past Surgical History:   Procedure Laterality Date    HYSTERECTOMY       Family History   Problem Relation Age of Onset    Arthritis Mother     Asthma Mother     Cancer Mother     Diabetes Mother     Heart Disease Mother      No Known Allergies  Current Outpatient Medications on File Prior to Visit Medication Sig Dispense Refill    FLUoxetine (PROZAC) 20 MG capsule Start back with 1 capsule daily by mouth, may titrate up 20 mg every 2 weeks as tolerated to 60 mg. 90 capsule 1    gabapentin (NEURONTIN) 800 MG tablet Take 1 tablet by mouth 4 times daily for 90 days. 120 tablet 1    promethazine (PHENERGAN) 25 MG tablet Take 1 tablet by mouth every 6 hours as needed for Nausea Take 25 mg by mouth every 6 hours as needed for Nausea 90 tablet 1    famotidine (PEPCID) 20 MG tablet Take 1 tablet by mouth as needed (heartburn) 90 tablet 1    topiramate (TOPAMAX) 100 MG tablet Take 1 tablet by mouth 2 times daily 60 tablet 5    nicotine (NICODERM CQ) 14 MG/24HR Place 1 patch onto the skin daily for 14 days (Patient not taking: Reported on 11/19/2021) 14 patch 5    nicotine polacrilex (NICORETTE) 4 MG gum Take 1 each by mouth as needed for Smoking cessation (Patient not taking: Reported on 11/19/2021) 110 each 3     No current facility-administered medications on file prior to visit. Review of Systems   Constitutional: Negative for activity change, chills, diaphoresis, fatigue and fever. HENT: Negative. Respiratory: Negative for cough, chest tightness, shortness of breath and wheezing. Cardiovascular: Negative for chest pain, palpitations and leg swelling. Gastrointestinal: Negative for nausea and vomiting. Genitourinary: Negative. Musculoskeletal: Positive for back pain (right lower back). Neurological: Negative for dizziness, syncope, light-headedness and headaches. Objective:   /82 (Site: Left Upper Arm, Position: Sitting, Cuff Size: Large Adult)   Pulse 108   Resp 18   Ht 5' 2\" (1.575 m)   Wt 253 lb (114.8 kg)   SpO2 98%   BMI 46.27 kg/m²     Physical Exam  Constitutional:       General: She is not in acute distress. Appearance: Normal appearance. She is normal weight. She is not diaphoretic. HENT:      Head: Normocephalic and atraumatic.    Eyes: Extraocular Movements: Extraocular movements intact. Conjunctiva/sclera: Conjunctivae normal.      Pupils: Pupils are equal, round, and reactive to light. Cardiovascular:      Rate and Rhythm: Normal rate and regular rhythm. Pulses: Normal pulses. Heart sounds: Normal heart sounds. No murmur heard. No friction rub. Pulmonary:      Effort: Pulmonary effort is normal. No respiratory distress. Breath sounds: Normal breath sounds. No wheezing or rhonchi. Chest:      Chest wall: No tenderness. Abdominal:      General: Abdomen is flat. Bowel sounds are normal. There is no distension. Palpations: Abdomen is soft. Tenderness: There is no abdominal tenderness. Genitourinary:     Comments: 2 x 1cm pedunculated mass over inferior aspect of left buttock. Soft in consistency, edges well defined without fluctuance. Non-tender, no differential warmth. Slightly erythematous. Musculoskeletal:         General: No tenderness. Normal range of motion. Cervical back: Normal range of motion and neck supple. Right lower leg: No edema. Left lower leg: No edema. Neurological:      General: No focal deficit present. Mental Status: She is alert and oriented to person, place, and time. Mental status is at baseline. Bilateral breast exam: Diffuse breast tenderness with no evidence of mass(es). No overlying erythema or differential warmth. No skin changes. No nipple/areola changes or nipple discharge. Assessment:      Prerna was seen today for breast pain and mass. Diagnoses and all orders for this visit:    Georgette Patel in female, severe        -     Bilateral mastalgia, no evidence of associated breast changes on exam.        -     Possibly cyclical in view of ovary preservation.  -     danazol (DANOCRINE) 50 MG capsule; Take 1 capsule by mouth 2 times daily for 7 days. Monitor for response.  -     meloxicam (MOBIC) 7.5 MG tablet;  Take 2 tablets by mouth daily for 14 days in view in view of coexisting lumbar back pain. Soft tissue mass       -     2 x 1cm pedunculated mass over inferior aspect of left buttock. Soft in consistency, edges well defined without fluctuance. Non-tender, no differential warmth. Slightly erythematous. -     Patient requesting referral for excision  -     [facility] [place]    Acute right-sided low back pain without sciatica  -     meloxicam (MOBIC) 7.5 MG tablet; Take 2 tablets by mouth daily for 14 days    Skin tag, left breast  Fleshy skin mole, b/l breasts        -     Patient requesting referral for excision/removal  -     Ambulatory referral to Dermatology    Family history of thyroid disease        -     Consistent weight gain despite no changes to diet. -     R/o thyroid disease: TSH with Reflex; Future        Health Maintenance   Topic Date Due    Varicella vaccine (1 of 2 - 2-dose childhood series) Never done    COVID-19 Vaccine (2 - Pfizer 2-dose series) 08/20/2021    Flu vaccine (1) 09/01/2021    DTaP/Tdap/Td vaccine (1 - Tdap) 12/10/2021 (Originally 4/9/2002)    Pneumococcal 0-64 years Vaccine (2 of 2 - PPSV23) 04/09/2048    Hepatitis C screen  Completed    HIV screen  Completed    Hepatitis A vaccine  Aged Out    Hepatitis B vaccine  Aged Out    Hib vaccine  Aged Out    Meningococcal (ACWY) vaccine  [employer]:    As above.     Orders Placed This Encounter   Procedures    TSH with Reflex     Standing Status:   Future     Standing Expiration Date:   11/19/2022   [facility] [place]     Referral Priority:   Routine     Referral Type:   Eval and Treat     Referral Reason:   Specialty Services Required     Requested Specialty:   General Surgery     Number of Visits Requested:   1    Ambulatory referral to Dermatology     Referral Priority:   Routine     Referral Type:   Eval and Treat     Referral Reason:   Specialty Services Required     Referred to Provider:   Lakesha Diaz DO Requested Specialty:   Family Medicine     Number of Visits Requested:   1     Orders Placed This Encounter   Medications    danazol (DANOCRINE) 50 MG capsule     Sig: Take 1 capsule by mouth 2 times daily for 7 days     Dispense:  14 capsule     Refill:  0    meloxicam (MOBIC) 7.5 MG tablet     Sig: Take 2 tablets by mouth daily for 14 days     Dispense:  28 tablet     Refill:  0       Patient counseled on treatment rationale and possible medication adverse effects; verbalizes understanding and willing to proceed with care.     Carrie Lin MD

## 2021-11-24 DIAGNOSIS — G60.0 CMT (CHARCOT-MARIE-TOOTH DISEASE): ICD-10-CM

## 2021-11-24 DIAGNOSIS — G62.9 NEUROPATHY: ICD-10-CM

## 2021-11-24 NOTE — TELEPHONE ENCOUNTER
Patient requesting medication refill. Please approve or deny this request.    Rx requested:  Requested Prescriptions     Pending Prescriptions Disp Refills    gabapentin (NEURONTIN) 800 MG tablet 120 tablet 1     Sig: Take 1 tablet by mouth 4 times daily for 90 days.          Last Office Visit:   8/9/2021      Next Visit Date:  Future Appointments   Date Time Provider Carlos Gutierrez   12/2/2021  2:30 PM Emanuel Garcia DO [facility]   12/28/2021  2:45 PM Shelbi Loera MD [address] [place] [address]

## 2021-11-26 RX ORDER — GABAPENTIN 800 MG/1
800 TABLET ORAL 4 TIMES DAILY
Qty: 120 TABLET | Refills: 1 | Status: SHIPPED | OUTPATIENT
Start: 2021-11-26 | End: 2022-01-24 | Stop reason: SDUPTHER

## 2021-12-21 ENCOUNTER — TELEPHONE (OUTPATIENT)
Dept: NEUROLOGY | Age: 36
End: 2021-12-21

## 2021-12-21 NOTE — TELEPHONE ENCOUNTER
Submitted PA via Cover my meds for aimovig 140mg      Coni Lin is processing your PA request and will respond shortly with next steps. You may close this dialog, return to your dashboard, and perform other tasks. To check for an update later, open this request again from your dashboard. If you need assistance, please chat with CoverMyMeds or call us at [phone].

## 2022-01-24 DIAGNOSIS — G62.9 NEUROPATHY: ICD-10-CM

## 2022-01-24 DIAGNOSIS — F32.A MOOD DISORDER OF DEPRESSED TYPE: ICD-10-CM

## 2022-01-24 DIAGNOSIS — G43.101 MIGRAINE WITH AURA AND WITH STATUS MIGRAINOSUS, NOT INTRACTABLE: ICD-10-CM

## 2022-01-24 DIAGNOSIS — G60.0 CMT (CHARCOT-MARIE-TOOTH DISEASE): ICD-10-CM

## 2022-01-24 RX ORDER — FLUOXETINE HYDROCHLORIDE 20 MG/1
CAPSULE ORAL
Qty: 90 CAPSULE | Refills: 1 | Status: SHIPPED | OUTPATIENT
Start: 2022-01-24 | End: 2022-03-21 | Stop reason: ALTCHOICE

## 2022-01-24 RX ORDER — TOPIRAMATE 100 MG/1
100 TABLET, FILM COATED ORAL 2 TIMES DAILY
Qty: 60 TABLET | Refills: 5 | Status: SHIPPED | OUTPATIENT
Start: 2022-01-24

## 2022-01-24 RX ORDER — GABAPENTIN 800 MG/1
800 TABLET ORAL 4 TIMES DAILY
Qty: 120 TABLET | Refills: 1 | Status: SHIPPED | OUTPATIENT
Start: 2022-01-24 | End: 2022-03-21 | Stop reason: SDUPTHER

## 2022-01-24 NOTE — TELEPHONE ENCOUNTER
Patient requesting medication refill. Please approve or deny this request.    Rx requested:  Requested Prescriptions     Pending Prescriptions Disp Refills    FLUoxetine (PROZAC) 20 MG capsule 90 capsule 1     Sig: Start back with 1 capsule daily by mouth, may titrate up 20 mg every 2 weeks as tolerated to 60 mg.    topiramate (TOPAMAX) 100 MG tablet 60 tablet 5     Sig: Take 1 tablet by mouth 2 times daily    gabapentin (NEURONTIN) 800 MG tablet 120 tablet 1     Sig: Take 1 tablet by mouth 4 times daily for 90 days.          Last Office Visit:   8/9/2021      Next Visit Date:  Future Appointments   Date Time Provider Carlos Gutierrez   3/3/2022 11:15 AM MARKEL Vargas [facility]

## 2022-02-16 ENCOUNTER — TELEMEDICINE (OUTPATIENT)
Dept: FAMILY MEDICINE CLINIC | Age: 37
End: 2022-02-16
Payer: MEDICARE

## 2022-02-16 DIAGNOSIS — B34.9 VIRAL ILLNESS: Primary | ICD-10-CM

## 2022-02-16 PROCEDURE — 99214 OFFICE O/P EST MOD 30 MIN: CPT | Performed by: FAMILY MEDICINE

## 2022-02-16 PROCEDURE — 87804 INFLUENZA ASSAY W/OPTIC: CPT | Performed by: FAMILY MEDICINE

## 2022-02-16 PROCEDURE — 87426 SARSCOV CORONAVIRUS AG IA: CPT | Performed by: FAMILY MEDICINE

## 2022-02-16 RX ORDER — ALBUTEROL SULFATE 90 UG/1
2 AEROSOL, METERED RESPIRATORY (INHALATION) EVERY 6 HOURS PRN
COMMUNITY
End: 2022-04-14 | Stop reason: SDUPTHER

## 2022-02-16 RX ORDER — ALBUTEROL SULFATE 90 UG/1
2 AEROSOL, METERED RESPIRATORY (INHALATION) 4 TIMES DAILY PRN
Qty: 18 G | Refills: 0 | Status: SHIPPED | OUTPATIENT
Start: 2022-02-16

## 2022-02-16 RX ORDER — BENZONATATE 100 MG/1
100 CAPSULE ORAL 3 TIMES DAILY PRN
Qty: 30 CAPSULE | Refills: 0 | Status: SHIPPED | OUTPATIENT
Start: 2022-02-16 | End: 2022-02-23

## 2022-02-16 RX ORDER — PREDNISONE 20 MG/1
40 TABLET ORAL DAILY
Qty: 10 TABLET | Refills: 0 | Status: SHIPPED | OUTPATIENT
Start: 2022-02-16 | End: 2022-02-21

## 2022-02-16 NOTE — PROGRESS NOTES
2022    TELEHEALTH EVALUATION -- Audio/Visual (During [facility]-29 public health emergency)    HPI:    Prerna Hammer (:  1985) has requested an audio/video evaluation for the following concern(s):    Cough (x 1 week; tested neg for covid yesterday rapid at home test; coughing up green mucus ), Pharyngitis (x 1 week ), and Otalgia (right ear hurts worse then left )    Cold or flu r something, head congestion, chest congestion, coughing, body aches, occasional scant green sputum; 2 home rapid tests are neg for COVID. +sweats. Normal taste/smell. BF has stuffy nose. Sore throat. Green a new. Chest burning. Somewhat short of breath. Quit smoking one month ago but uses [facility]. Prior to Visit Medications    Medication Sig Taking? Authorizing Provider   albuterol sulfate  (90 Base) MCG/ACT inhaler Inhale 2 puffs into the lungs every 6 hours as needed for Wheezing Yes Historical Provider, MD   albuterol sulfate  (90 Base) MCG/ACT inhaler Inhale 2 puffs into the lungs 4 times daily as needed for Wheezing Yes Lois Brothers MD   FLUoxetine (PROZAC) 20 MG capsule Start back with 1 capsule daily by mouth, may titrate up 20 mg every 2 weeks as tolerated to 60 mg. Yes Michell Callahan PA-C   topiramate (TOPAMAX) 100 MG tablet Take 1 tablet by mouth 2 times daily Yes Michell Callahan PA-C   gabapentin (NEURONTIN) 800 MG tablet Take 1 tablet by mouth 4 times daily for 90 days.  Yes Michell Callahan PA-C   promethazine (PHENERGAN) 25 MG tablet Take 1 tablet by mouth every 6 hours as needed for Nausea Take 25 mg by mouth every 6 hours as needed for Nausea Yes Corry Johnson MD   famotidine (PEPCID) 20 MG tablet Take 1 tablet by mouth as needed (heartburn) Yes Michell Callahan PA-C   danazol (DANOCRINE) 50 MG capsule Take 1 capsule by mouth 2 times daily for 7 days  Corry Johnson MD   meloxicam (MOBIC) 7.5 MG tablet Take 2 tablets by mouth daily for 14 days  Corry Johnson MD       Social History for cough, pharyngitis and otalgia. Diagnoses and all orders for this visit:    Viral illness  Comments:  ADvised COVID PCR and influenza POC testing. Supportive care measures reviewed. Prednisone, bezonatate, albuterol (pt request)  Orders:  -     POCT COVID-19, Antigen  -     POCT Influenza A/B  -     Covid-19 Ambulatory; Future  -     predniSONE (DELTASONE) 20 MG tablet; Take 2 tablets by mouth daily for 5 days  -     albuterol sulfate  (90 Base) MCG/ACT inhaler; Inhale 2 puffs into the lungs 4 times daily as needed for Wheezing  -     benzonatate (TESSALON) 100 MG capsule; Take 1 capsule by mouth 3 times daily as needed for Cough        Reviewed with the patient: all disease processes, current clinical status, medications, activities and diet.      Side effects, adverse effects of the medication prescribed today, as well as treatment plan/ rationale and result expectations have been discussed with the patient who expresses understanding and desires to proceed.     Close follow up to evaluate treatment results and for coordination of care. I have reviewed the patient's medical history in detail and updated the computerized patient record. More than 50% of the appointment was spent in face-to-face counseling, education and care coordination. Return if symptoms worsen or fail to improve. Prerna Hammer, was evaluated through a synchronous (real-time) audio-video encounter. The patient (or guardian if applicable) is aware that this is a billable service, which includes applicable co-pays. This Virtual Visit was conducted with patient's (and/or legal guardian's) consent. The visit was conducted pursuant to the emergency declaration under the [address] [place] [address], [address] [place] [address] authority and the [employer] and [employer] General Act. Patient identification was verified, and a caregiver was present when appropriate.  The patient was located at home in a state where the provider was licensed to provide care. Total time spent on this encounter: Not billed by time    --Cierra Bueno MD on 2/26/2022 at 3:15 PM    An electronic signature was used to authenticate this note.

## 2022-03-21 ENCOUNTER — OFFICE VISIT (OUTPATIENT)
Dept: FAMILY MEDICINE CLINIC | Age: 37
End: 2022-03-21
Payer: MEDICARE

## 2022-03-21 VITALS
WEIGHT: 265 LBS | SYSTOLIC BLOOD PRESSURE: 126 MMHG | HEART RATE: 84 BPM | OXYGEN SATURATION: 98 % | RESPIRATION RATE: 18 BRPM | TEMPERATURE: 98 F | BODY MASS INDEX: 46.95 KG/M2 | HEIGHT: 63 IN | DIASTOLIC BLOOD PRESSURE: 82 MMHG

## 2022-03-21 DIAGNOSIS — R53.82 CHRONIC FATIGUE: ICD-10-CM

## 2022-03-21 DIAGNOSIS — G60.0 CMT (CHARCOT-MARIE-TOOTH DISEASE): Primary | ICD-10-CM

## 2022-03-21 DIAGNOSIS — G62.9 NEUROPATHY: ICD-10-CM

## 2022-03-21 DIAGNOSIS — G59 MONONEUROPATHY DUE TO UNDERLYING DISEASE: ICD-10-CM

## 2022-03-21 DIAGNOSIS — R06.81 APNEA: ICD-10-CM

## 2022-03-21 DIAGNOSIS — F32.A MOOD DISORDER OF DEPRESSED TYPE: ICD-10-CM

## 2022-03-21 DIAGNOSIS — G47.9 SLEEP DISORDER, UNSPECIFIED: ICD-10-CM

## 2022-03-21 DIAGNOSIS — R73.03 PREDIABETES: ICD-10-CM

## 2022-03-21 DIAGNOSIS — R79.89 OTHER SPECIFIED ABNORMAL FINDINGS OF BLOOD CHEMISTRY: ICD-10-CM

## 2022-03-21 DIAGNOSIS — G43.101 MIGRAINE WITH AURA AND WITH STATUS MIGRAINOSUS, NOT INTRACTABLE: ICD-10-CM

## 2022-03-21 DIAGNOSIS — R63.5 WEIGHT GAIN: ICD-10-CM

## 2022-03-21 DIAGNOSIS — M25.50 ARTHRALGIA, UNSPECIFIED JOINT: ICD-10-CM

## 2022-03-21 PROCEDURE — 99214 OFFICE O/P EST MOD 30 MIN: CPT | Performed by: PHYSICIAN ASSISTANT

## 2022-03-21 PROCEDURE — G8484 FLU IMMUNIZE NO ADMIN: HCPCS | Performed by: PHYSICIAN ASSISTANT

## 2022-03-21 PROCEDURE — G8427 DOCREV CUR MEDS BY ELIG CLIN: HCPCS | Performed by: PHYSICIAN ASSISTANT

## 2022-03-21 PROCEDURE — G8417 CALC BMI ABV UP PARAM F/U: HCPCS | Performed by: PHYSICIAN ASSISTANT

## 2022-03-21 PROCEDURE — 4004F PT TOBACCO SCREEN RCVD TLK: CPT | Performed by: PHYSICIAN ASSISTANT

## 2022-03-21 RX ORDER — GABAPENTIN 800 MG/1
800 TABLET ORAL 4 TIMES DAILY
Qty: 120 TABLET | Refills: 1 | Status: SHIPPED | OUTPATIENT
Start: 2022-03-21 | End: 2022-03-22 | Stop reason: ALTCHOICE

## 2022-03-21 RX ORDER — VILAZODONE HYDROCHLORIDE 10 MG-20MG
KIT ORAL
Qty: 1 KIT | Refills: 0 | Status: SHIPPED | OUTPATIENT
Start: 2022-03-21 | End: 2022-04-14 | Stop reason: DRUGHIGH

## 2022-03-21 RX ORDER — RIMEGEPANT SULFATE 75 MG/75MG
TABLET, ORALLY DISINTEGRATING ORAL
Qty: 6 TABLET | Refills: 11 | Status: SHIPPED | OUTPATIENT
Start: 2022-03-21 | End: 2022-03-31 | Stop reason: SDUPTHER

## 2022-03-21 RX ORDER — KETOROLAC TROMETHAMINE 10 MG/1
10 TABLET, FILM COATED ORAL EVERY 6 HOURS PRN
Qty: 20 TABLET | Refills: 0 | Status: SHIPPED | OUTPATIENT
Start: 2022-03-21 | End: 2022-03-22

## 2022-03-21 ASSESSMENT — ENCOUNTER SYMPTOMS
BLOOD IN STOOL: 0
ABDOMINAL DISTENTION: 0
TROUBLE SWALLOWING: 0
SHORTNESS OF BREATH: 0
NAUSEA: 0
WHEEZING: 0
COUGH: 0
BACK PAIN: 1
CHEST TIGHTNESS: 1
ABDOMINAL PAIN: 0
COLOR CHANGE: 0
CONSTIPATION: 0
RECTAL PAIN: 0
DIARRHEA: 0

## 2022-03-21 NOTE — PROGRESS NOTES
Subjective  Prerna ARASELI Hammer, 43 y.o. female presents today with:  Chief Complaint   Patient presents with    Weight Gain     Patient states she has doubled in size the last 2 years she states the weight gain has caused pain with mobility     Migraine     x 1 week with watery eyes      HPI  Last OV with me: 8/9/21 as NP. Did not have labs completed that were previously ordered. Concerned about weight gain. Has steadily been gaining weight over the past several months. Feels extremely uncomfortable. Affecting her mood, intimacy with boyfriend. Feeling more depressed. Denies SI/HI. Has not seen her two daughters that live in [place] with their dad. Was because of COVID, transportation and now they are busy with activities. Very much has affected her. Currently taking prozac. Would like to switch medication. Concerned about sleep apnea. Boyfriend has witnessed apneic episodes at night. She is chronically fatigued. CMT-established care with neurology. Discussed going on lyrica, but patient opted not to due to weight gain side effects. She is currently on gabapentin. Needing refill today. Has almost constant tingling in feet.  +arthralgias. Chronic almost daily migraine HAs. She is on 100 mg topamax. Has been on imitrex and maxalt without relief. Taking naproxen with little relief. Stress makes HAs worse. Review of Systems   Constitutional: Positive for activity change, appetite change, fatigue and unexpected weight change. Negative for chills, diaphoresis and fever. HENT: Negative for congestion, nosebleeds, postnasal drip and trouble swallowing. Eyes: Negative for visual disturbance. Respiratory: Positive for chest tightness. Negative for cough, shortness of breath and wheezing. Cardiovascular: Negative for chest pain and leg swelling.    Gastrointestinal: Negative for abdominal distention, abdominal pain, blood in stool, constipation, diarrhea, nausea and rectal pain. Genitourinary: Negative for menstrual problem ([facility]) and pelvic pain. Musculoskeletal: Positive for arthralgias, back pain and myalgias. Skin: Negative for color change, pallor and rash. Neurological: Positive for weakness, numbness and headaches. Negative for dizziness, tremors, seizures, syncope, facial asymmetry and light-headedness. Psychiatric/Behavioral: Positive for dysphoric mood and sleep disturbance. Negative for agitation, confusion, decreased concentration, hallucinations, self-injury and suicidal ideas. The patient is nervous/anxious. The patient is not hyperactive. Past Medical History:   Diagnosis Date    Arthritis     Bipolar 1 disorder ([facility] [address]     Carpal tunnel syndrome     CMT (Charcot-Melany-Tooth disease)     Depression     Headache      Past Surgical History:   Procedure Laterality Date    HYSTERECTOMY       Social History     Socioeconomic History    Marital status:      Spouse name: Not on file    Number of children: Not on file    Years of education: Not on file    Highest education level: Not on file   Occupational History    Not on file   Tobacco Use    Smoking status: Current Every Day Smoker    Smokeless tobacco: Never Used    Tobacco comment: actively trying to quit   Substance and Sexual Activity    Alcohol use: No    Drug use: No    Sexual activity: Not on file   Other Topics Concern    Not on file   Social History Narrative    Not on file     Social Determinants of Health     Financial Resource Strain: High Risk    Difficulty of Paying Living Expenses: Very hard   Food Insecurity: Food Insecurity Present    Worried About Running Out of Food in the Last Year: Often true    Stevan of Food in the Last Year: Often true   Transportation Needs:     Lack of Transportation (Medical): Not on file    Lack of Transportation (Non-Medical):  Not on file   Physical Activity:     Days of Exercise per Week: Not on file    Minutes of Exercise per Session: Not on file   Stress:     Feeling of Stress : Not on file   Social Connections:     Frequency of Communication with Friends and Family: Not on file    Frequency of Social Gatherings with Friends and Family: Not on file    Attends Congregation Services: Not on file    Active Member of Clubs or Organizations: Not on file    Attends Club or Organization Meetings: Not on file    Marital Status: Not on file   Intimate Partner Violence:     Fear of Current or Ex-Partner: Not on file    Emotionally Abused: Not on file    Physically Abused: Not on file    Sexually Abused: Not on file   Housing Stability:     Unable to Pay for Housing in the Last Year: Not on file    Number of [place] in the Last Year: Not on file    Unstable Housing in the Last Year: Not on file     Family History   Problem Relation Age of Onset    Arthritis Mother     Asthma Mother     Cancer Mother     Diabetes Mother     Heart Disease Mother      No Known Allergies  Current Outpatient Medications   Medication Sig Dispense Refill    Rimegepant Sulfate (NURTEC) 75 MG TBDP Take at onset of migraine, no more than 1 tab in 24 hr. 6 tablet 11    Vilazodone HCl (VIIBRYD STARTER PACK) 10 & 20 MG KIT Take as directed for major depression. 1 kit 0    ketorolac (TORADOL) 10 MG tablet Take 1 tablet by mouth every 6 hours as needed for Pain 20 tablet 0    gabapentin (NEURONTIN) 800 MG tablet Take 1 tablet by mouth 4 times daily for 90 days.  120 tablet 1    albuterol sulfate  (90 Base) MCG/ACT inhaler Inhale 2 puffs into the lungs every 6 hours as needed for Wheezing      albuterol sulfate  (90 Base) MCG/ACT inhaler Inhale 2 puffs into the lungs 4 times daily as needed for Wheezing 18 g 0    topiramate (TOPAMAX) 100 MG tablet Take 1 tablet by mouth 2 times daily 60 tablet 5    promethazine (PHENERGAN) 25 MG tablet Take 1 tablet by mouth every 6 hours as needed for Nausea Take 25 mg by mouth every 6 hours as needed for Nausea 90 tablet 1    famotidine (PEPCID) 20 MG tablet Take 1 tablet by mouth as needed (heartburn) 90 tablet 1     No current facility-administered medications for this visit. PMH, Surgical Hx, Family Hx, and Social Hx reviewed and updated. Health Maintenance reviewed. Objective  Vitals:    03/21/22 1424   BP: 126/82   Pulse: 84   Resp: 18   Temp: 98 °F (36.7 °C)   TempSrc: Temporal   SpO2: 98%   Weight: 265 lb (120.2 kg)   Height: 5' 3\" (1.6 m)     BP Readings from Last 3 Encounters:   03/21/22 126/82   11/19/21 120/82   09/07/21 138/88     Wt Readings from Last 3 Encounters:   03/21/22 265 lb (120.2 kg)   11/19/21 253 lb (114.8 kg)   09/07/21 239 lb 12.8 oz (108.8 kg)     Physical Exam  Vitals reviewed. Constitutional:       General: She is in acute distress (tearful). Appearance: She is obese. She is not ill-appearing, toxic-appearing or diaphoretic. HENT:      Head: Normocephalic and atraumatic. Right Ear: Tympanic membrane, ear canal and external ear normal.      Left Ear: Tympanic membrane, ear canal and external ear normal.      Nose: Nose normal.      Mouth/Throat:      Mouth: Mucous membranes are moist.   Eyes:      Conjunctiva/sclera: Conjunctivae normal.   Cardiovascular:      Rate and Rhythm: Normal rate and regular rhythm. Pulmonary:      Effort: Pulmonary effort is normal.      Breath sounds: Normal breath sounds. Musculoskeletal:         General: Tenderness present. Right lower leg: No edema. Left lower leg: No edema. Neurological:      General: No focal deficit present. Mental Status: She is alert and oriented to person, place, and time. Coordination: Coordination normal.      Gait: Gait normal.   Psychiatric:         Attention and Perception: Attention normal.         Mood and Affect: Mood is anxious and depressed. Affect is tearful. Affect is not blunt or angry.          Speech: Speech normal.         Behavior: Behavior normal. Thought Content: Thought content normal.         Cognition and Memory: Cognition normal.         Judgment: Judgment normal.      Comments: Very upset in the office today. Patient not currently working. +depressed mood, denies SI/HI. Has three girls--20 y/o and 12 and 38 y/o (two younger living in [place], [place]). Feels like she does not have a strong support network at this time. Assessment & Plan   Prerna was seen today for weight gain and migraine. Diagnoses and all orders for this visit:    CMT (Charcot-Melany-Tooth disease)  -     gabapentin (NEURONTIN) 800 MG tablet; Take 1 tablet by mouth 4 times daily for 90 days. Weight gain  -     Insulin Free & Total; Future  -     TSH; Future  -     T4, Free; Future  -     Hemoglobin A1C; Future    Mononeuropathy due to underlying disease  -     LAURENT Screen With Reflex; Future  -     Vitamin B12 & Folate; Future    Migraine with aura and with status migrainosus, not intractable  -     Rimegepant Sulfate (NURTEC) 75 MG TBDP; Take at onset of migraine, no more than 1 tab in 24 hr.    Mood disorder of depressed type  -     Vilazodone HCl (VIIBRYD STARTER PACK) 10 & 20 MG KIT; Take as directed for major depression. Chronic fatigue  -     CBC; Future  -     Vitamin D 25 Hydroxy; Future  -     Insulin Free & Total; Future  -     Hemoglobin A1C; Future    BMI 45.0-49.9, adult ([facility])  -     Comprehensive Metabolic Panel; Future  -     Lipid, Fasting; Future  -     Hemoglobin A1C; Future  -     Baseline Diagnostic Sleep Study; Future    Arthralgia, unspecified joint  -     ketorolac (TORADOL) 10 MG tablet; Take 1 tablet by mouth every 6 hours as needed for Pain    Other specified abnormal findings of blood chemistry   -     Lipid, Fasting; Future    Body mass index (BMI) 45.0-49.9, adult ([facility])   -     Vitamin D 25 Hydroxy; Future    Prediabetes   -     Hemoglobin A1C; Future    Neuropathy  -     gabapentin (NEURONTIN) 800 MG tablet;  Take 1 tablet by mouth 4 times daily for 90 days. Apnea  -     Baseline Diagnostic Sleep Study; Future    Sleep disorder, unspecified   -     Baseline Diagnostic Sleep Study; Future    Multiple concerns addressed today. 2 week follow up. Trial viibryd for depression, wean off of prozac. Continue gabapentin at this time. Nurtec and ubrevley samples given today. Orders Placed This Encounter   Procedures    CBC     Standing Status:   Future     Standing Expiration Date:   3/21/2023    Comprehensive Metabolic Panel     Standing Status:   Future     Standing Expiration Date:   3/21/2023    Lipid, Fasting     Standing Status:   Future     Standing Expiration Date:   3/21/2023    Vitamin D 25 Hydroxy     Standing Status:   Future     Standing Expiration Date:   3/21/2023    Insulin Free & Total     Standing Status:   Future     Standing Expiration Date:   3/21/2023    TSH     Standing Status:   Future     Standing Expiration Date:   3/21/2023    T4, Free     Standing Status:   Future     Standing Expiration Date:   3/21/2023    LAURENT Screen With Reflex     Standing Status:   Future     Standing Expiration Date:   3/21/2023    Vitamin B12 & Folate     Standing Status:   Future     Standing Expiration Date:   3/21/2023    Hemoglobin A1C     Standing Status:   Future     Standing Expiration Date:   3/21/2023    Baseline Diagnostic Sleep Study     Patients with abnormal results will be assessed and referred to the sleep  as needed.      Standing Status:   Future     Standing Expiration Date:   3/21/2023     Scheduling Instructions:      Scheduling and Pre-Certification: [phone]      The following must be completed and FAXED to [phone]      1) Sleep Evaluation Orders Form      2) Office note justifying study      3) Demographic info / insurance card     Order Specific Question:   Adult or Pediatric     Answer:   Adult Study (>7 Years)     Order Specific Question:   Location For Sleep Study     Answer:   [facility] Order Specific Question:   Select Sleep Lab Location     Answer:   [facility]     Comments:   [place]     Orders Placed This Encounter   Medications    Rimegepant Sulfate (NURTEC) 75 MG TBDP     Sig: Take at onset of migraine, no more than 1 tab in 24 hr.     Dispense:  6 tablet     Refill:  11     FAILED maxalt, imitrex and topamax.  Vilazodone HCl (VIIBRYD STARTER PACK) 10 & 20 MG KIT     Sig: Take as directed for major depression. Dispense:  1 kit     Refill:  0    ketorolac (TORADOL) 10 MG tablet     Sig: Take 1 tablet by mouth every 6 hours as needed for Pain     Dispense:  20 tablet     Refill:  0    gabapentin (NEURONTIN) 800 MG tablet     Sig: Take 1 tablet by mouth 4 times daily for 90 days. Dispense:  120 tablet     Refill:  1     Medications Discontinued During This Encounter   Medication Reason    danazol (DANOCRINE) 50 MG capsule LIST CLEANUP    meloxicam (MOBIC) 7.5 MG tablet LIST CLEANUP    FLUoxetine (PROZAC) 20 MG capsule Therapy completed    gabapentin (NEURONTIN) 800 MG tablet REORDER     No follow-ups on file. Reviewed with the patient: current clinical status, medications, activities and diet. Side effects, adverse effects of the medication prescribed today, as well as treatment plan/ rationale and result expectations have been discussed with the patient who expresses understanding and desires to proceed. Close follow up to evaluate treatment results and for coordination of care. I have reviewed the patient's medical history in detail and updated the computerized patient record.     Michell Callahan PA-C

## 2022-03-22 ENCOUNTER — TELEPHONE (OUTPATIENT)
Dept: FAMILY MEDICINE CLINIC | Age: 37
End: 2022-03-22

## 2022-03-22 DIAGNOSIS — G60.0 CMT (CHARCOT-MARIE-TOOTH DISEASE): Primary | ICD-10-CM

## 2022-03-22 DIAGNOSIS — G59 MONONEUROPATHY DUE TO UNDERLYING DISEASE: ICD-10-CM

## 2022-03-22 DIAGNOSIS — G62.9 NEUROPATHY: ICD-10-CM

## 2022-03-22 RX ORDER — PREGABALIN 50 MG/1
50 CAPSULE ORAL 3 TIMES DAILY
Qty: 90 CAPSULE | Refills: 0 | Status: SHIPPED | OUTPATIENT
Start: 2022-03-22 | End: 2022-03-30 | Stop reason: DRUGHIGH

## 2022-03-22 NOTE — TELEPHONE ENCOUNTER
Spoke with patient to advise her that I received an approval for Nurtec PA      Also Pt states she was on 200 MG TID previously

## 2022-03-22 NOTE — TELEPHONE ENCOUNTER
Prerna calling because the Toradol is not covered by insurance. I spoke to the pharmacy and this is correct. They had to use a discount card and still have OOP payment. Pt is asking if she can go back to using the Lyrica instead of the gabapentin and Toradol? She states the gabapentin does not do anything for her and she doesn't want to continue a medication that does not help. Please advise and thank you.

## 2022-03-23 ENCOUNTER — TELEPHONE (OUTPATIENT)
Dept: FAMILY MEDICINE CLINIC | Age: 37
End: 2022-03-23

## 2022-03-23 RX ORDER — VILAZODONE HYDROCHLORIDE 10 MG-20MG
KIT ORAL
Refills: 0 | OUTPATIENT
Start: 2022-03-23

## 2022-03-23 NOTE — TELEPHONE ENCOUNTER
Patient is asking if she can take 2 Lyricas at the same time then the other in the evening. She states when she takes 1 it is not working.      Please advise and thank you

## 2022-03-28 ENCOUNTER — TELEPHONE (OUTPATIENT)
Dept: FAMILY MEDICINE CLINIC | Age: 37
End: 2022-03-28

## 2022-03-28 NOTE — TELEPHONE ENCOUNTER
Patient calling in stating PCP changed medications of gabapentin and she stated the Lyrica is not working     Patient wants to know if dose can be increased     Please advise

## 2022-03-29 DIAGNOSIS — G43.101 MIGRAINE WITH AURA AND WITH STATUS MIGRAINOSUS, NOT INTRACTABLE: Primary | ICD-10-CM

## 2022-03-29 DIAGNOSIS — G62.9 NEUROPATHY: ICD-10-CM

## 2022-03-29 NOTE — TELEPHONE ENCOUNTER
----- Message from [employer] sent at 3/28/2022  5:00 PM EDT -----  Subject: Message to Provider    QUESTIONS  Information for Provider? pt. is calling in reporting migraine medication   isn't helping  ---------------------------------------------------------------------------  --------------  CALL BACK INFO  What is the best way for the office to contact you? OK to leave message on   voicemail  Preferred Call Back Phone Number? [phone]  ---------------------------------------------------------------------------  --------------  SCRIPT ANSWERS  Relationship to Patient? Self  Would you describe this as the worst headache of your life? No  Are you having fevers (100.4), chills or sweats? No  Are you having weakness on one side of your body, drooping on one side of   your face or difficult speaking? No  Have you had any injuries to your head? No  Have you recently (14 days) seen a provider for this issue?  Yes

## 2022-03-29 NOTE — TELEPHONE ENCOUNTER
Referral to neurology placed. Continue topamax, 100 mg, BID. The nurtec can be taken every other day as migraine prevention. We can try that until she is seen.

## 2022-03-29 NOTE — TELEPHONE ENCOUNTER
Patient states the migraine medication is not working. She is asking what she should do?      Please advise and thank you

## 2022-03-30 RX ORDER — PREGABALIN 75 MG/1
75 CAPSULE ORAL 3 TIMES DAILY
Qty: 90 CAPSULE | Refills: 0 | Status: SHIPPED | OUTPATIENT
Start: 2022-03-30 | End: 2022-04-14 | Stop reason: SDUPTHER

## 2022-03-30 NOTE — TELEPHONE ENCOUNTER
Patient called in asking when the new dose of lyrica is going to be sent over to pharmacy?     Please advise and thank you

## 2022-03-31 DIAGNOSIS — G43.101 MIGRAINE WITH AURA AND WITH STATUS MIGRAINOSUS, NOT INTRACTABLE: ICD-10-CM

## 2022-03-31 RX ORDER — RIMEGEPANT SULFATE 75 MG/75MG
TABLET, ORALLY DISINTEGRATING ORAL
Qty: 16 TABLET | Refills: 4 | Status: SHIPPED | OUTPATIENT
Start: 2022-03-31

## 2022-04-14 ENCOUNTER — OFFICE VISIT (OUTPATIENT)
Dept: FAMILY MEDICINE CLINIC | Age: 37
End: 2022-04-14
Payer: MEDICARE

## 2022-04-14 VITALS
HEART RATE: 84 BPM | WEIGHT: 274 LBS | SYSTOLIC BLOOD PRESSURE: 102 MMHG | DIASTOLIC BLOOD PRESSURE: 60 MMHG | BODY MASS INDEX: 48.55 KG/M2 | HEIGHT: 63 IN

## 2022-04-14 DIAGNOSIS — G62.9 NEUROPATHY: ICD-10-CM

## 2022-04-14 DIAGNOSIS — F32.A MOOD DISORDER OF DEPRESSED TYPE: Primary | ICD-10-CM

## 2022-04-14 DIAGNOSIS — F43.10 PTSD (POST-TRAUMATIC STRESS DISORDER): ICD-10-CM

## 2022-04-14 DIAGNOSIS — G60.0 CMT (CHARCOT-MARIE-TOOTH DISEASE): ICD-10-CM

## 2022-04-14 PROCEDURE — G8417 CALC BMI ABV UP PARAM F/U: HCPCS | Performed by: PHYSICIAN ASSISTANT

## 2022-04-14 PROCEDURE — G8427 DOCREV CUR MEDS BY ELIG CLIN: HCPCS | Performed by: PHYSICIAN ASSISTANT

## 2022-04-14 PROCEDURE — 1036F TOBACCO NON-USER: CPT | Performed by: PHYSICIAN ASSISTANT

## 2022-04-14 PROCEDURE — 99214 OFFICE O/P EST MOD 30 MIN: CPT | Performed by: PHYSICIAN ASSISTANT

## 2022-04-14 RX ORDER — NABUMETONE 500 MG/1
500 TABLET, FILM COATED ORAL 2 TIMES DAILY
Qty: 60 TABLET | Refills: 3 | Status: SHIPPED | OUTPATIENT
Start: 2022-04-14

## 2022-04-14 RX ORDER — PREGABALIN 150 MG/1
150 CAPSULE ORAL 3 TIMES DAILY
Qty: 90 CAPSULE | Refills: 0 | Status: SHIPPED | OUTPATIENT
Start: 2022-04-14 | End: 2022-05-12

## 2022-04-14 RX ORDER — VILAZODONE HYDROCHLORIDE 20 MG/1
20 TABLET ORAL DAILY
Qty: 30 TABLET | Refills: 2 | Status: SHIPPED | OUTPATIENT
Start: 2022-04-14 | End: 2022-06-21 | Stop reason: SDUPTHER

## 2022-04-14 ASSESSMENT — ENCOUNTER SYMPTOMS
NAUSEA: 0
BLOOD IN STOOL: 0
WHEEZING: 0
CHEST TIGHTNESS: 0
DIARRHEA: 0
CONSTIPATION: 0
TROUBLE SWALLOWING: 0
RECTAL PAIN: 0
SHORTNESS OF BREATH: 0
BACK PAIN: 1
ABDOMINAL DISTENTION: 0
COLOR CHANGE: 0
COUGH: 0
ABDOMINAL PAIN: 0

## 2022-04-19 ENCOUNTER — TELEPHONE (OUTPATIENT)
Dept: FAMILY MEDICINE CLINIC | Age: 37
End: 2022-04-19

## 2022-04-19 NOTE — TELEPHONE ENCOUNTER
Patient is asking for her Lyrica to be increased. She states her whole body is sore and she cannot walk.      Please advise and thank you

## 2022-04-22 ENCOUNTER — TELEPHONE (OUTPATIENT)
Dept: FAMILY MEDICINE CLINIC | Age: 37
End: 2022-04-22

## 2022-04-22 DIAGNOSIS — G59 MONONEUROPATHY DUE TO UNDERLYING DISEASE: ICD-10-CM

## 2022-04-22 DIAGNOSIS — G60.0 CMT (CHARCOT-MARIE-TOOTH DISEASE): Primary | ICD-10-CM

## 2022-04-22 DIAGNOSIS — G62.9 NEUROPATHY: ICD-10-CM

## 2022-04-22 DIAGNOSIS — G43.101 MIGRAINE WITH AURA AND WITH STATUS MIGRAINOSUS, NOT INTRACTABLE: ICD-10-CM

## 2022-04-22 NOTE — TELEPHONE ENCOUNTER
Patient has requested to be switched back to the Gabapentin, due to her body aches and pain in her legs. She states she is having difficulty walking and not able to perform her job duties.

## 2022-04-22 NOTE — TELEPHONE ENCOUNTER
Can we try the 150 mg lyrica for a month, if not improved, we can switch back to gabapentin. Would she want to see PMR--physical medicine rehab--for some recommendations/suggestions for pain control?

## 2022-04-25 NOTE — TELEPHONE ENCOUNTER
Pt made aware, states she is starting a new job and is having a lot of pain and would like to start back on gabapentin but is willing to do lyrica 150mg  Is also okay with starting Physical rehab medicine

## 2022-05-06 NOTE — TELEPHONE ENCOUNTER
Pt states she will stay on the lyrica states she is still having some pain in knees and feet.  Has not started Physical rehab but states she has been doing some pt at home

## 2022-05-11 DIAGNOSIS — G62.9 NEUROPATHY: ICD-10-CM

## 2022-05-12 DIAGNOSIS — G62.9 NEUROPATHY: ICD-10-CM

## 2022-05-12 RX ORDER — PREGABALIN 150 MG/1
150 CAPSULE ORAL 3 TIMES DAILY
Qty: 90 CAPSULE | Refills: 2 | Status: SHIPPED | OUTPATIENT
Start: 2022-05-12 | End: 2022-08-03 | Stop reason: SDUPTHER

## 2022-05-12 NOTE — TELEPHONE ENCOUNTER
Rx request   Requested Prescriptions     Pending Prescriptions Disp Refills    pregabalin (LYRICA) 150 MG capsule [Pharmacy Med Name: pregabalin 150 mg capsule] 90 capsule 0     Sig: Take 1 capsule by mouth 3 times daily for 30 days.      LOV 4/14/2022  Next Visit Date:  Future Appointments   Date Time Provider Carlos Gutierrez   5/17/2022 11:45 AM MAREKL Don   8/9/2022  3:00 PM Ele Grant MD [address] [place] [address]

## 2022-05-13 RX ORDER — PREGABALIN 150 MG/1
150 CAPSULE ORAL 3 TIMES DAILY
Qty: 90 CAPSULE | Refills: 0 | OUTPATIENT
Start: 2022-05-13 | End: 2022-06-12

## 2022-05-13 NOTE — TELEPHONE ENCOUNTER
pharmacy requesting medication refill. Please approve or deny this request.    Rx requested:  Requested Prescriptions     Pending Prescriptions Disp Refills    pregabalin (LYRICA) 150 MG capsule 90 capsule 0     Sig: Take 1 capsule by mouth 3 times daily for 30 days.          Last Office Visit:   4/14/2022      Next Visit Date:  Future Appointments   Date Time Provider Carlos Gutierrez   5/17/2022 11:45 AM MARKEL Gonzalez   8/9/2022  3:00 PM Danny Valles  [address]

## 2022-06-21 ENCOUNTER — TELEPHONE (OUTPATIENT)
Dept: FAMILY MEDICINE CLINIC | Age: 37
End: 2022-06-21

## 2022-06-21 DIAGNOSIS — F43.10 PTSD (POST-TRAUMATIC STRESS DISORDER): ICD-10-CM

## 2022-06-21 RX ORDER — VILAZODONE HYDROCHLORIDE 20 MG/1
20 TABLET ORAL DAILY
Qty: 30 TABLET | Refills: 2 | Status: SHIPPED | OUTPATIENT
Start: 2022-06-21

## 2022-06-21 NOTE — TELEPHONE ENCOUNTER
----- Message from Bouchra Wallermarysol sent at 6/20/2022  2:11 PM EDT -----  Subject: Medication Problem    QUESTIONS  Name of Medication? vilazodone HCl (VILAZODONE HCL) 20 MG TABS  Patient-reported dosage and instructions? ONE A DAY  What question or problem do you have with the medication? Pt stated that   this med was discontinued by JL Callahan and pt doesn't know why and she   doesn't having any left. Preferred Pharmacy? [id number] - [address] phone number (if available)? [phone]  Additional Information for Provider?   ---------------------------------------------------------------------------  --------------  CALL BACK INFO  What is the best way for the office to contact you? OK to leave message on   voicemail  Preferred Call Back Phone Number? [phone]  ---------------------------------------------------------------------------  --------------  SCRIPT ANSWERS  Relationship to Patient?  Self

## 2022-08-03 DIAGNOSIS — G62.9 NEUROPATHY: ICD-10-CM

## 2022-08-03 RX ORDER — PREGABALIN 150 MG/1
150 CAPSULE ORAL 3 TIMES DAILY
Qty: 90 CAPSULE | Refills: 2 | Status: SHIPPED | OUTPATIENT
Start: 2022-08-07 | End: 2022-11-01

## 2022-11-01 DIAGNOSIS — G62.9 NEUROPATHY: ICD-10-CM

## 2022-11-01 RX ORDER — PREGABALIN 150 MG/1
CAPSULE ORAL
Qty: 90 CAPSULE | Refills: 2 | Status: SHIPPED | OUTPATIENT
Start: 2022-11-01 | End: 2023-01-30

## 2022-11-01 NOTE — TELEPHONE ENCOUNTER
Patient states that she called the pharmacy about a possible refill on the Lyrica 150 mg. and they do not have any refills of this prescription. Patient states that the pharmacy is sending over a refill request via fax.

## 2022-12-02 DIAGNOSIS — F43.10 PTSD (POST-TRAUMATIC STRESS DISORDER): ICD-10-CM

## 2022-12-03 NOTE — TELEPHONE ENCOUNTER
pharmacy requesting medication refill. Please approve or deny this request.    Rx requested:  Requested Prescriptions     Pending Prescriptions Disp Refills    VILAZODONE HCL 20 MG Tablet [Pharmacy Med Name: Viibryd 20 mg tablet] 30 tablet 2     Sig: Take 1 tablet by mouth daily         Last Office Visit:   4/14/2022      Next Visit Date:  No future appointments.

## 2022-12-06 DIAGNOSIS — F43.10 PTSD (POST-TRAUMATIC STRESS DISORDER): ICD-10-CM

## 2022-12-06 NOTE — TELEPHONE ENCOUNTER
Patient is requesting a refill.   Patient states that she ran out on 12/2/2022    LOV 3/21/2022  Scheduled  2/3/2023

## 2022-12-08 RX ORDER — VILAZODONE HYDROCHLORIDE 20 MG/1
20 TABLET ORAL DAILY
Qty: 30 TABLET | Refills: 2 | Status: SHIPPED | OUTPATIENT
Start: 2022-12-08

## 2022-12-09 RX ORDER — VILAZODONE HYDROCHLORIDE 20 MG/1
TABLET ORAL
Qty: 30 TABLET | Refills: 2 | OUTPATIENT
Start: 2022-12-09

## 2023-01-24 DIAGNOSIS — K29.70 GASTRITIS WITHOUT BLEEDING, UNSPECIFIED CHRONICITY, UNSPECIFIED GASTRITIS TYPE: ICD-10-CM

## 2023-01-24 RX ORDER — FAMOTIDINE 20 MG/1
20 TABLET, FILM COATED ORAL PRN
Qty: 90 TABLET | Refills: 1 | Status: SHIPPED | OUTPATIENT
Start: 2023-01-24

## 2023-01-24 NOTE — TELEPHONE ENCOUNTER
Comments:     Last Office Visit (last PCP visit):   4/14/2022    Next Visit Date:  Future Appointments   Date Time Provider Carlos Gutierrez   2/3/2023  3:00 PM MARKEL Hartman [facility] [place]       **If hasn't been seen in over a year OR hasn't followed up according to last diabetes/ADHD visit, make appointment for patient before sending refill to provider.     Rx requested:  Requested Prescriptions     Pending Prescriptions Disp Refills    famotidine (PEPCID) 20 MG tablet 90 tablet 1     Sig: Take 1 tablet by mouth as needed (heartburn)

## 2023-01-26 DIAGNOSIS — G62.9 NEUROPATHY: ICD-10-CM

## 2023-01-26 RX ORDER — PREGABALIN 150 MG/1
CAPSULE ORAL
Qty: 90 CAPSULE | Refills: 2 | OUTPATIENT
Start: 2023-01-26 | End: 2023-04-26

## 2023-02-03 ENCOUNTER — OFFICE VISIT (OUTPATIENT)
Dept: FAMILY MEDICINE CLINIC | Age: 38
End: 2023-02-03

## 2023-02-03 VITALS
HEART RATE: 115 BPM | OXYGEN SATURATION: 93 % | RESPIRATION RATE: 18 BRPM | WEIGHT: 277 LBS | BODY MASS INDEX: 49.08 KG/M2 | DIASTOLIC BLOOD PRESSURE: 82 MMHG | SYSTOLIC BLOOD PRESSURE: 122 MMHG | HEIGHT: 63 IN

## 2023-02-03 DIAGNOSIS — G43.101 MIGRAINE WITH AURA AND WITH STATUS MIGRAINOSUS, NOT INTRACTABLE: ICD-10-CM

## 2023-02-03 DIAGNOSIS — G62.9 NEUROPATHY: ICD-10-CM

## 2023-02-03 DIAGNOSIS — G89.29 CHRONIC PAIN OF RIGHT KNEE: ICD-10-CM

## 2023-02-03 DIAGNOSIS — G59 MONONEUROPATHY DUE TO UNDERLYING DISEASE: ICD-10-CM

## 2023-02-03 DIAGNOSIS — R79.9 ABNORMAL FINDING OF BLOOD CHEMISTRY, UNSPECIFIED: ICD-10-CM

## 2023-02-03 DIAGNOSIS — R79.89 ABNORMAL SERUM THYROXINE (T4) LEVEL: ICD-10-CM

## 2023-02-03 DIAGNOSIS — F32.A MOOD DISORDER OF DEPRESSED TYPE: ICD-10-CM

## 2023-02-03 DIAGNOSIS — M25.561 CHRONIC PAIN OF RIGHT KNEE: ICD-10-CM

## 2023-02-03 DIAGNOSIS — R94.6 ABNORMAL RESULTS OF THYROID FUNCTION STUDIES: ICD-10-CM

## 2023-02-03 DIAGNOSIS — F31.9 BIPOLAR 1 DISORDER, DEPRESSED (HCC): ICD-10-CM

## 2023-02-03 DIAGNOSIS — G60.0 CMT (CHARCOT-MARIE-TOOTH DISEASE): ICD-10-CM

## 2023-02-03 PROBLEM — K29.70 GASTRITIS WITHOUT BLEEDING: Status: RESOLVED | Noted: 2021-08-09 | Resolved: 2023-02-03

## 2023-02-03 LAB
ALBUMIN SERPL-MCNC: 4.3 G/DL (ref 3.5–4.6)
ALP BLD-CCNC: 114 U/L (ref 40–130)
ALT SERPL-CCNC: 26 U/L (ref 0–33)
ANION GAP SERPL CALCULATED.3IONS-SCNC: 13 MEQ/L (ref 9–15)
AST SERPL-CCNC: 20 U/L (ref 0–35)
BILIRUB SERPL-MCNC: 0.3 MG/DL (ref 0.2–0.7)
BUN BLDV-MCNC: 13 MG/DL (ref 6–20)
CALCIUM SERPL-MCNC: 9.4 MG/DL (ref 8.5–9.9)
CHLORIDE BLD-SCNC: 102 MEQ/L (ref 95–107)
CO2: 24 MEQ/L (ref 20–31)
CREAT SERPL-MCNC: 0.74 MG/DL (ref 0.5–0.9)
GFR SERPL CREATININE-BSD FRML MDRD: >60 ML/MIN/{1.73_M2}
GLOBULIN: 3.1 G/DL (ref 2.3–3.5)
GLUCOSE BLD-MCNC: 92 MG/DL (ref 70–99)
HBA1C MFR BLD: 5.8 % (ref 4.8–5.9)
HCT VFR BLD CALC: 42.3 % (ref 37–47)
HEMOGLOBIN: 13.8 G/DL (ref 12–16)
MCH RBC QN AUTO: 28.3 PG (ref 27–31.3)
MCHC RBC AUTO-ENTMCNC: 32.7 % (ref 33–37)
MCV RBC AUTO: 86.6 FL (ref 79.4–94.8)
PDW BLD-RTO: 15.6 % (ref 11.5–14.5)
PLATELET # BLD: 271 K/UL (ref 130–400)
POTASSIUM SERPL-SCNC: 3.7 MEQ/L (ref 3.4–4.9)
RBC # BLD: 4.89 M/UL (ref 4.2–5.4)
SODIUM BLD-SCNC: 139 MEQ/L (ref 135–144)
T4 FREE: 1.12 NG/DL (ref 0.84–1.68)
TOTAL PROTEIN: 7.4 G/DL (ref 6.3–8)
TSH SERPL DL<=0.05 MIU/L-ACNC: 0.74 UIU/ML (ref 0.44–3.86)
WBC # BLD: 8.8 K/UL (ref 4.8–10.8)

## 2023-02-03 RX ORDER — NABUMETONE 500 MG/1
500 TABLET, FILM COATED ORAL 2 TIMES DAILY
Qty: 60 TABLET | Refills: 3 | Status: SHIPPED | OUTPATIENT
Start: 2023-02-03

## 2023-02-03 RX ORDER — RIMEGEPANT SULFATE 75 MG/75MG
TABLET, ORALLY DISINTEGRATING ORAL
Qty: 16 TABLET | Refills: 4 | Status: SHIPPED | OUTPATIENT
Start: 2023-02-03

## 2023-02-03 RX ORDER — ALBUTEROL SULFATE 90 UG/1
2 AEROSOL, METERED RESPIRATORY (INHALATION) 4 TIMES DAILY PRN
Qty: 18 G | Refills: 0 | Status: SHIPPED | OUTPATIENT
Start: 2023-02-03

## 2023-02-03 RX ORDER — PREGABALIN 150 MG/1
CAPSULE ORAL
Qty: 90 CAPSULE | Refills: 2 | Status: SHIPPED | OUTPATIENT
Start: 2023-02-03 | End: 2023-05-04

## 2023-02-03 RX ORDER — OLANZAPINE 2.5 MG/1
2.5 TABLET ORAL NIGHTLY
Qty: 30 TABLET | Refills: 3 | Status: SHIPPED | OUTPATIENT
Start: 2023-02-03

## 2023-02-03 RX ORDER — PROMETHAZINE HYDROCHLORIDE 25 MG/1
25 TABLET ORAL EVERY 6 HOURS PRN
Qty: 90 TABLET | Refills: 1 | Status: SHIPPED | OUTPATIENT
Start: 2023-02-03

## 2023-02-03 ASSESSMENT — ENCOUNTER SYMPTOMS
CHEST TIGHTNESS: 0
COUGH: 0
SHORTNESS OF BREATH: 0
BLOOD IN STOOL: 0
RECTAL PAIN: 0
DIARRHEA: 0
BACK PAIN: 1
ABDOMINAL DISTENTION: 0
ABDOMINAL PAIN: 0
WHEEZING: 0
NAUSEA: 0
COLOR CHANGE: 0
CONSTIPATION: 0
TROUBLE SWALLOWING: 0

## 2023-02-03 ASSESSMENT — PATIENT HEALTH QUESTIONNAIRE - PHQ9
3. TROUBLE FALLING OR STAYING ASLEEP: 3
5. POOR APPETITE OR OVEREATING: 0
8. MOVING OR SPEAKING SO SLOWLY THAT OTHER PEOPLE COULD HAVE NOTICED. OR THE OPPOSITE, BEING SO FIGETY OR RESTLESS THAT YOU HAVE BEEN MOVING AROUND A LOT MORE THAN USUAL: 0
SUM OF ALL RESPONSES TO PHQ QUESTIONS 1-9: 15
2. FEELING DOWN, DEPRESSED OR HOPELESS: 3
1. LITTLE INTEREST OR PLEASURE IN DOING THINGS: 3
SUM OF ALL RESPONSES TO PHQ9 QUESTIONS 1 & 2: 6
10. IF YOU CHECKED OFF ANY PROBLEMS, HOW DIFFICULT HAVE THESE PROBLEMS MADE IT FOR YOU TO DO YOUR WORK, TAKE CARE OF THINGS AT HOME, OR GET ALONG WITH OTHER PEOPLE: 2
7. TROUBLE CONCENTRATING ON THINGS, SUCH AS READING THE NEWSPAPER OR WATCHING TELEVISION: 0
SUM OF ALL RESPONSES TO PHQ QUESTIONS 1-9: 15
6. FEELING BAD ABOUT YOURSELF - OR THAT YOU ARE A FAILURE OR HAVE LET YOURSELF OR YOUR FAMILY DOWN: 3
SUM OF ALL RESPONSES TO PHQ QUESTIONS 1-9: 15
9. THOUGHTS THAT YOU WOULD BE BETTER OFF DEAD, OR OF HURTING YOURSELF: 0
4. FEELING TIRED OR HAVING LITTLE ENERGY: 3
SUM OF ALL RESPONSES TO PHQ QUESTIONS 1-9: 15

## 2023-02-03 NOTE — PROGRESS NOTES
Subjective  Prerna ARASELI Hammer, 42 y.o. female presents today with:  Chief Complaint   Patient presents with    Follow-up    Knee Pain     Twisted knee in October and has been having issues with it     Depression     Has been having manic depression states for the last year this has been really bad will not leave her house       HPI  Last OV with me: 4/14/22   Due for labs. Reports worsening depression over the past couple of months. Initially was started on viibryd with fair response. History of bipolar/mood disorder. Feels extremely sad/depressed. Does not want to leave her house. Sleeping too much. Feels little motivation to complete tasks given her mood. Feels irritable/on edge. Source of stress is her relationship with her three daughters. Would like to discuss weight. Very much would like to lose weight; has been unsuccessful with weight loss attempts in the past.  Weight loss never seems to be sustained. Would like to discuss bariatric surgery. Did not have sleep study that was previously ordered. Had a twisting injury of her R knee 10/31/22. Since that time, knee feels unstable. Will give out; pain along lateral aspect. Has been taking NSAID and elevating without much relief. Intermittent swelling. Needing nurtec refill today for migraine HAs--medication is working well. History of CMT/fibromyalgia--worse with mood changes. She is taking lyrica 150 mg, TID. Review of Systems   Constitutional:  Positive for fatigue and unexpected weight change. Negative for activity change, appetite change, chills, diaphoresis and fever. HENT:  Negative for congestion, nosebleeds, postnasal drip and trouble swallowing. Eyes:  Negative for visual disturbance. Respiratory:  Negative for cough, chest tightness, shortness of breath and wheezing. Cardiovascular:  Negative for chest pain and leg swelling.    Gastrointestinal:  Negative for abdominal distention, abdominal pain, blood in stool, constipation, diarrhea, nausea and rectal pain.   Genitourinary:  Negative for menstrual problem (CASEY) and pelvic pain.   Musculoskeletal:  Positive for arthralgias, back pain and myalgias.   Skin:  Negative for color change, pallor and rash.   Neurological:  Positive for weakness. Negative for dizziness, tremors, seizures, syncope, facial asymmetry, light-headedness, numbness and headaches.        Improving   Psychiatric/Behavioral:  Positive for sleep disturbance. Negative for agitation, confusion, decreased concentration, dysphoric mood, hallucinations, self-injury and suicidal ideas. The patient is not nervous/anxious and is not hyperactive.      Past Medical History:   Diagnosis Date    Arthritis     Bipolar 1 disorder (HCC)     Carpal tunnel syndrome     CMT (Charcot-Melany-Tooth disease)     Depression     Headache      Past Surgical History:   Procedure Laterality Date    HYSTERECTOMY       Social History     Socioeconomic History    Marital status:      Spouse name: Not on file    Number of children: Not on file    Years of education: Not on file    Highest education level: Not on file   Occupational History    Not on file   Tobacco Use    Smoking status: Former    Smokeless tobacco: Never    Tobacco comments:     Vaping   Vaping Use    Vaping Use: Every day    Substances: Nicotine   Substance and Sexual Activity    Alcohol use: No    Drug use: No    Sexual activity: Not on file   Other Topics Concern    Not on file   Social History Narrative    Not on file     Social Determinants of Health     Financial Resource Strain: Not on file   Food Insecurity: Not on file   Transportation Needs: Not on file   Physical Activity: Not on file   Stress: Not on file   Social Connections: Not on file   Intimate Partner Violence: Not on file   Housing Stability: Not on file     Family History   Problem Relation Age of Onset    Arthritis Mother     Asthma Mother     Cancer Mother     Diabetes  Mother     Heart Disease Mother      No Known Allergies  Current Outpatient Medications   Medication Sig Dispense Refill    albuterol sulfate HFA (PROVENTIL;VENTOLIN;PROAIR) 108 (90 Base) MCG/ACT inhaler Inhale 2 puffs into the lungs 4 times daily as needed for Wheezing 18 g 0    nabumetone (RELAFEN) 500 MG tablet Take 1 tablet by mouth 2 times daily 60 tablet 3    pregabalin (LYRICA) 150 MG capsule Take 1 capsule by mouth in the morning, 1 capsule at noon, and 1 capsule before bedtime. 90 capsule 2    promethazine (PHENERGAN) 25 MG tablet Take 1 tablet by mouth every 6 hours as needed for Nausea Take 25 mg by mouth every 6 hours as needed for Nausea 90 tablet 1    Rimegepant Sulfate (NURTEC) 75 MG TBDP Take 1 tab po every other day for migraine prevention. 16 tablet 4    OLANZapine (ZYPREXA) 2.5 MG tablet Take 1 tablet by mouth nightly 30 tablet 3    famotidine (PEPCID) 20 MG tablet Take 1 tablet by mouth as needed (heartburn) 90 tablet 1    topiramate (TOPAMAX) 100 MG tablet Take 1 tablet by mouth 2 times daily 60 tablet 5     No current facility-administered medications for this visit. PMH, Surgical Hx, Family Hx, and Social Hx reviewed and updated. Health Maintenance reviewed. Objective  Vitals:    02/03/23 1504   BP: 122/82   Site: Left Upper Arm   Position: Sitting   Cuff Size: Large Adult   Pulse: (!) 115   Resp: 18   SpO2: 93%   Weight: 277 lb (125.6 kg)   Height: 5' 3\" (1.6 m)     BP Readings from Last 3 Encounters:   02/03/23 122/82   04/14/22 102/60   03/21/22 126/82     Wt Readings from Last 3 Encounters:   02/03/23 277 lb (125.6 kg)   04/14/22 274 lb (124.3 kg)   03/21/22 265 lb (120.2 kg)     Physical Exam  Vitals reviewed. Constitutional:       General: She is not in acute distress. Appearance: She is obese. She is not ill-appearing, toxic-appearing or diaphoretic. HENT:      Head: Normocephalic and atraumatic.       Right Ear: Tympanic membrane, ear canal and external ear normal. Left Ear: Tympanic membrane, ear canal and external ear normal.      Nose: Nose normal.      Mouth/Throat:      Mouth: Mucous membranes are moist.   Eyes:      Conjunctiva/sclera: Conjunctivae normal.   Cardiovascular:      Rate and Rhythm: Normal rate and regular rhythm. Pulmonary:      Effort: Pulmonary effort is normal.      Breath sounds: Normal breath sounds. Musculoskeletal:         General: Tenderness present. Right knee: Bony tenderness present. Normal range of motion. Tenderness present over the lateral joint line and LCL. Abnormal meniscus. Right lower leg: No edema. Left lower leg: No edema. Skin:     General: Skin is warm. Neurological:      General: No focal deficit present. Mental Status: She is alert and oriented to person, place, and time. Coordination: Coordination normal.      Gait: Gait normal.   Psychiatric:         Attention and Perception: Attention normal.         Mood and Affect: Mood is anxious and depressed. Affect is tearful. Affect is not blunt or angry. Speech: Speech normal.         Behavior: Behavior normal.         Thought Content: Thought content normal.         Cognition and Memory: Cognition normal.         Judgment: Judgment normal.      Comments: Tearful, depressed. Assessment & Plan    Diagnosis Orders   1. BMI 45.0-49.9, adult [facility])  Margaret Arceo MD, Bariatric Surgery, [place]    CBC    Comprehensive Metabolic Panel    Hemoglobin A1C    Insulin Free & Total    Vitamin D 25 Hydroxy      2. CMT (Charcot-Melany-Tooth disease)  nabumetone (RELAFEN) 500 MG tablet    Margaret Arceo MD, Bariatric Surgery, [place]      3. Neuropathy  pregabalin (LYRICA) 150 MG capsule    Raul Castellon MD, Bariatric Surgery, [place]      4. Migraine with aura and with status migrainosus, not intractable  promethazine (PHENERGAN) 25 MG tablet    Rimegepant Sulfate (NURTEC) 75 MG TBDP      5.  Mood disorder of depressed type OLANZapine (ZYPREXA) 2.5 MG tablet      6. Abnormal serum thyroxine (T4) level  TSH    T4, Free      7. Abnormal finding of blood chemistry, unspecified   Hemoglobin A1C    TSH    T4, Free      8. Abnormal results of thyroid function studies   TSH    T4, Free      9. Chronic pain of right knee  XR KNEE RIGHT (3 VIEWS)      10. Bipolar 1 disorder, depressed ([facility] [address]        11. Mononeuropathy due to underlying disease        4-6 week follow up with me. Discussed bariatric surgery at length, would like referral.   Wean off of viibryd, start zyprexa, monitor response. Continue lyrica. Orders Placed This Encounter   Procedures    XR KNEE RIGHT (3 VIEWS)     Standing Status:   Future     Standing Expiration Date:   2/3/2024     Order Specific Question:   Reason for exam:     Answer:   twisting injury, R knee 10/31/22.     CBC     Standing Status:   Future     Number of Occurrences:   1     Standing Expiration Date:   2/3/2024    Comprehensive Metabolic Panel     Standing Status:   Future     Number of Occurrences:   1     Standing Expiration Date:   2/3/2024    Hemoglobin A1C     Standing Status:   Future     Number of Occurrences:   1     Standing Expiration Date:   2/3/2024    TSH     Standing Status:   Future     Number of Occurrences:   1     Standing Expiration Date:   2/3/2024    Insulin Free & Total     Standing Status:   Future     Number of Occurrences:   1     Standing Expiration Date:   2/3/2024    Vitamin D 25 Hydroxy     Standing Status:   Future     Number of Occurrences:   1     Standing Expiration Date:   2/3/2024    T4, Free     Standing Status:   Future     Number of Occurrences:   1     Standing Expiration Date:   2/3/2024    Luz Jackson MD, Bariatric Surgery, [place]     Referral Priority:   Routine     Referral Type:   Eval and Treat     Referral Reason:   Specialty Services Required     Referred to Provider:   Gaviota Warren MD     Requested Specialty:   Bariatric Surgery Number of Visits Requested:   1     Orders Placed This Encounter   Medications    albuterol sulfate HFA (PROVENTIL;VENTOLIN;PROAIR) 108 (90 Base) MCG/ACT inhaler     Sig: Inhale 2 puffs into the lungs 4 times daily as needed for Wheezing     Dispense:  18 g     Refill:  0    nabumetone (RELAFEN) 500 MG tablet     Sig: Take 1 tablet by mouth 2 times daily     Dispense:  60 tablet     Refill:  3    pregabalin (LYRICA) 150 MG capsule     Sig: Take 1 capsule by mouth in the morning, 1 capsule at noon, and 1 capsule before bedtime. Dispense:  90 capsule     Refill:  2    promethazine (PHENERGAN) 25 MG tablet     Sig: Take 1 tablet by mouth every 6 hours as needed for Nausea Take 25 mg by mouth every 6 hours as needed for Nausea     Dispense:  90 tablet     Refill:  1    Rimegepant Sulfate (NURTEC) 75 MG TBDP     Sig: Take 1 tab po every other day for migraine prevention. Dispense:  16 tablet     Refill:  4     FAILED maxalt, imitrex and topamax. OLANZapine (ZYPREXA) 2.5 MG tablet     Sig: Take 1 tablet by mouth nightly     Dispense:  30 tablet     Refill:  3     Medications Discontinued During This Encounter   Medication Reason    vilazodone hcl 20 MG TABS Alternate therapy    promethazine (PHENERGAN) 25 MG tablet REORDER    albuterol sulfate  (90 Base) MCG/ACT inhaler REORDER    Rimegepant Sulfate (NURTEC) 75 MG TBDP REORDER    nabumetone (RELAFEN) 500 MG tablet REORDER    pregabalin (LYRICA) 150 MG capsule REORDER     No follow-ups on file. Reviewed with the patient: current clinical status, medications, activities and diet. Side effects, adverse effects of the medication prescribed today, as well as treatment plan/ rationale and result expectations have been discussed with the patient who expresses understanding and desires to proceed. Close follow up to evaluate treatment results and for coordination of care.   I have reviewed the patient's medical history in detail and updated the computerized patient record.     Michell Callahan PA-C

## 2023-02-04 LAB — VITAMIN D 25-HYDROXY: 16.2 NG/ML

## 2023-02-06 ENCOUNTER — TELEPHONE (OUTPATIENT)
Dept: FAMILY MEDICINE CLINIC | Age: 38
End: 2023-02-06

## 2023-02-06 DIAGNOSIS — E55.9 VITAMIN D DEFICIENCY: Primary | ICD-10-CM

## 2023-02-06 LAB
INSULIN FREE: 61 UIU/ML (ref 3–25)
INSULIN: 76 UIU/ML (ref 3–25)

## 2023-02-06 RX ORDER — IRON HEME POLYPEPTIDE/FOLIC AC 12-1MG
5000 TABLET ORAL DAILY
Qty: 90 CAPSULE | Refills: 1 | Status: SHIPPED | OUTPATIENT
Start: 2023-02-06

## 2023-02-06 NOTE — TELEPHONE ENCOUNTER
Patient would like something else different for the zyprexa 2.5 mg because she was reading up on it and it causes weight gain. Patient would like something else different please advise. Call patient after medication is switched.

## 2023-02-06 NOTE — TELEPHONE ENCOUNTER
That low of a dose, should not cause gain. If she wants to try a sample of something, we can give 2 boxes of vraylar 1.5 mg daily.

## 2023-02-07 NOTE — TELEPHONE ENCOUNTER
Spoke with patient who stated she never started the zyprexa, and she is already currently on Bertis Blood, should he just continue this medication ?

## 2023-02-10 DIAGNOSIS — G60.0 CMT (CHARCOT-MARIE-TOOTH DISEASE): ICD-10-CM

## 2023-02-10 DIAGNOSIS — G43.101 MIGRAINE WITH AURA AND WITH STATUS MIGRAINOSUS, NOT INTRACTABLE: ICD-10-CM

## 2023-02-11 DIAGNOSIS — E88.81 INSULIN RESISTANCE: Primary | ICD-10-CM

## 2023-02-11 RX ORDER — DULAGLUTIDE 0.75 MG/.5ML
0.75 INJECTION, SOLUTION SUBCUTANEOUS WEEKLY
Qty: 4 ADJUSTABLE DOSE PRE-FILLED PEN SYRINGE | Refills: 3 | Status: SHIPPED | OUTPATIENT
Start: 2023-02-11

## 2023-02-13 RX ORDER — TOPIRAMATE 100 MG/1
100 TABLET, FILM COATED ORAL 2 TIMES DAILY
Qty: 60 TABLET | Refills: 5 | Status: SHIPPED | OUTPATIENT
Start: 2023-02-13

## 2023-02-13 NOTE — TELEPHONE ENCOUNTER
Comments: This request is coming from the pharmacy. Last Office Visit (last PCP visit):   2/3/2023    Next Visit Date:  Future Appointments   Date Time Provider Carlos Gutierrez   3/28/2023  3:00 PM MARKEL Hartman [facility]       If hasn't been seen in over a year OR hasn't followed up according to last diabetes/ADHD visit, make appointment for patient before sending refill to provider.     Rx requested:  Requested Prescriptions     Pending Prescriptions Disp Refills    topiramate (TOPAMAX) 100 MG tablet 60 tablet 5     Sig: Take 1 tablet by mouth 2 times daily

## 2023-03-09 DIAGNOSIS — F43.10 PTSD (POST-TRAUMATIC STRESS DISORDER): ICD-10-CM

## 2023-03-10 NOTE — TELEPHONE ENCOUNTER
Rx request   Requested Prescriptions     Pending Prescriptions Disp Refills    VILAZODONE HCL 20 MG Tablet [Pharmacy Med Name: Viibryd 20 mg tablet] 30 tablet 2     Sig: Take 1 tablet by mouth daily     LOV 2/3/2023  Next Visit Date:  Future Appointments   Date Time Provider Carlos Gutierrez   3/28/2023  3:00 PM Thiago Storm PA-C [facility]

## 2023-03-13 RX ORDER — VILAZODONE HYDROCHLORIDE 20 MG/1
TABLET ORAL
Qty: 30 TABLET | Refills: 2 | Status: SHIPPED | OUTPATIENT
Start: 2023-03-13

## 2023-05-01 DIAGNOSIS — G62.9 NEUROPATHY: ICD-10-CM

## 2023-05-02 DIAGNOSIS — G62.9 NEUROPATHY: ICD-10-CM

## 2023-05-03 DIAGNOSIS — E88.81 INSULIN RESISTANCE: ICD-10-CM

## 2023-05-03 RX ORDER — PREGABALIN 150 MG/1
CAPSULE ORAL
Qty: 90 CAPSULE | Refills: 2 | Status: SHIPPED | OUTPATIENT
Start: 2023-05-03 | End: 2023-08-01

## 2023-05-03 NOTE — TELEPHONE ENCOUNTER
requesting medication refill.      Rx requested:  Requested Prescriptions     Pending Prescriptions Disp Refills    pregabalin (LYRICA) 150 MG capsule [Pharmacy Med Name: pregabalin 150 mg capsule] 90 capsule 2     Sig: TAKE 1 CAPSULE BY MOUTH IN THE MORNING, 1 capsule at noon, and 1 capsule before bedtime       Last Office Visit:   2/3/2023    Last Tox screen:      Last Medication contract:      Last dispensed on: 4/3/2023      Next Visit Date:  Future Appointments   Date Time Provider Carlos Gutierrez   5/31/2023  3:00 PM Rick Haley PA-C [address] [place] [address]

## 2023-05-04 RX ORDER — PREGABALIN 150 MG/1
CAPSULE ORAL
Qty: 90 CAPSULE | Refills: 2 | OUTPATIENT
Start: 2023-05-04 | End: 2023-07-30

## 2023-05-04 RX ORDER — DULAGLUTIDE 0.75 MG/.5ML
0.75 INJECTION, SOLUTION SUBCUTANEOUS WEEKLY
Qty: 4 ADJUSTABLE DOSE PRE-FILLED PEN SYRINGE | Refills: 3 | Status: SHIPPED | OUTPATIENT
Start: 2023-05-04

## 2023-05-30 ENCOUNTER — TELEPHONE (OUTPATIENT)
Dept: FAMILY MEDICINE CLINIC | Age: 38
End: 2023-05-30

## 2023-05-30 NOTE — TELEPHONE ENCOUNTER
Patient does not have transportation and would like to know if her visit on 5/31 can be vv.     Please advise    [phone]

## 2023-05-31 ENCOUNTER — TELEMEDICINE (OUTPATIENT)
Dept: FAMILY MEDICINE CLINIC | Age: 38
End: 2023-05-31
Payer: MEDICARE

## 2023-05-31 DIAGNOSIS — G60.0 CMT (CHARCOT-MARIE-TOOTH DISEASE): ICD-10-CM

## 2023-05-31 DIAGNOSIS — E88.81 INSULIN RESISTANCE: Primary | ICD-10-CM

## 2023-05-31 PROCEDURE — 99214 OFFICE O/P EST MOD 30 MIN: CPT | Performed by: PHYSICIAN ASSISTANT

## 2023-05-31 PROCEDURE — G8428 CUR MEDS NOT DOCUMENT: HCPCS | Performed by: PHYSICIAN ASSISTANT

## 2023-05-31 SDOH — ECONOMIC STABILITY: HOUSING INSECURITY
IN THE LAST 12 MONTHS, WAS THERE A TIME WHEN YOU DID NOT HAVE A STEADY PLACE TO SLEEP OR SLEPT IN A SHELTER (INCLUDING NOW)?: NO

## 2023-05-31 SDOH — ECONOMIC STABILITY: INCOME INSECURITY: HOW HARD IS IT FOR YOU TO PAY FOR THE VERY BASICS LIKE FOOD, HOUSING, MEDICAL CARE, AND HEATING?: VERY HARD

## 2023-05-31 SDOH — ECONOMIC STABILITY: FOOD INSECURITY: WITHIN THE PAST 12 MONTHS, THE FOOD YOU BOUGHT JUST DIDN'T LAST AND YOU DIDN'T HAVE MONEY TO GET MORE.: PATIENT DECLINED

## 2023-05-31 SDOH — ECONOMIC STABILITY: TRANSPORTATION INSECURITY
IN THE PAST 12 MONTHS, HAS LACK OF TRANSPORTATION KEPT YOU FROM MEETINGS, WORK, OR FROM GETTING THINGS NEEDED FOR DAILY LIVING?: YES

## 2023-05-31 SDOH — ECONOMIC STABILITY: FOOD INSECURITY: WITHIN THE PAST 12 MONTHS, YOU WORRIED THAT YOUR FOOD WOULD RUN OUT BEFORE YOU GOT MONEY TO BUY MORE.: SOMETIMES TRUE

## 2023-05-31 ASSESSMENT — ENCOUNTER SYMPTOMS
ABDOMINAL DISTENTION: 0
TROUBLE SWALLOWING: 0
DIARRHEA: 0
WHEEZING: 0
NAUSEA: 0
BLOOD IN STOOL: 0
CONSTIPATION: 0
SHORTNESS OF BREATH: 0
COLOR CHANGE: 0
CHEST TIGHTNESS: 0
BACK PAIN: 1
ABDOMINAL PAIN: 0
RECTAL PAIN: 0
COUGH: 0

## 2023-05-31 NOTE — PROGRESS NOTES
2023    TELEHEALTH EVALUATION -- Audio/Visual ([facility]    Due to COVID 19 outbreak, patient's office visit was converted to a virtual visit. Patient was contacted and agreed to proceed with a virtual visit via [address] Visit  The risks and benefits of converting to a virtual visit were discussed in light of the current infectious disease epidemic. Patient also understood that insurance coverage and co-pays are up to their individual insurance plans. HPI:    [employer] (:  1985) has requested an audio/video evaluation for the following concern(s):    VV today for follow up. Last OV with me: 2/3/23    Compliant with her medications. Doing well on viibryd therapy. Discussed trying zyprexa at last OV, did not start as she was worried about the weight gain. Mental health feels stable/OK. Going through some stressors, but feels like she is managing OK. Started on trulicity for insulin resistance, unable to tolerate metform. Doing well. Asking if dose can be increased. CMT--stable at this time with medication. Was seeing Dr. Gabrielle Mcclain, no follow up since . Open to new referral at neuromuscular clinic. Review of Systems   Constitutional:  Positive for fatigue. Negative for activity change, appetite change, chills, diaphoresis, fever and unexpected weight change. HENT:  Negative for congestion, nosebleeds, postnasal drip and trouble swallowing. Eyes:  Negative for visual disturbance. Respiratory:  Negative for cough, chest tightness, shortness of breath and wheezing. Cardiovascular:  Negative for chest pain and leg swelling. Gastrointestinal:  Negative for abdominal distention, abdominal pain, blood in stool, constipation, diarrhea, nausea and rectal pain. Genitourinary:  Negative for menstrual problem (CASEY) and pelvic pain. Musculoskeletal:  Positive for arthralgias, back pain and myalgias.    Skin:  Negative for color change,

## 2023-06-28 DIAGNOSIS — G60.0 CMT (CHARCOT-MARIE-TOOTH DISEASE): ICD-10-CM

## 2023-06-28 DIAGNOSIS — G43.101 MIGRAINE WITH AURA AND WITH STATUS MIGRAINOSUS, NOT INTRACTABLE: ICD-10-CM

## 2023-06-28 DIAGNOSIS — K29.70 GASTRITIS WITHOUT BLEEDING, UNSPECIFIED CHRONICITY, UNSPECIFIED GASTRITIS TYPE: ICD-10-CM

## 2023-06-29 RX ORDER — PROMETHAZINE HYDROCHLORIDE 25 MG/1
25 TABLET ORAL EVERY 6 HOURS PRN
Qty: 90 TABLET | Refills: 1 | Status: SHIPPED | OUTPATIENT
Start: 2023-06-29

## 2023-06-29 RX ORDER — FAMOTIDINE 20 MG/1
20 TABLET, FILM COATED ORAL PRN
Qty: 90 TABLET | Refills: 1 | Status: SHIPPED | OUTPATIENT
Start: 2023-06-29

## 2023-06-29 RX ORDER — NABUMETONE 500 MG/1
500 TABLET, FILM COATED ORAL 2 TIMES DAILY
Qty: 60 TABLET | Refills: 3 | Status: SHIPPED | OUTPATIENT
Start: 2023-06-29

## 2023-07-28 DIAGNOSIS — F43.10 PTSD (POST-TRAUMATIC STRESS DISORDER): ICD-10-CM

## 2023-07-28 DIAGNOSIS — G62.9 NEUROPATHY: ICD-10-CM

## 2023-08-01 DIAGNOSIS — F43.10 PTSD (POST-TRAUMATIC STRESS DISORDER): ICD-10-CM

## 2023-08-01 DIAGNOSIS — G62.9 NEUROPATHY: ICD-10-CM

## 2023-08-01 RX ORDER — PREGABALIN 150 MG/1
CAPSULE ORAL
Qty: 90 CAPSULE | Refills: 2 | OUTPATIENT
Start: 2023-08-01

## 2023-08-01 RX ORDER — VILAZODONE HYDROCHLORIDE 20 MG/1
20 TABLET ORAL DAILY
Qty: 30 TABLET | Refills: 5 | Status: SHIPPED | OUTPATIENT
Start: 2023-08-01

## 2023-08-01 RX ORDER — VILAZODONE HYDROCHLORIDE 20 MG/1
20 TABLET ORAL DAILY
Qty: 30 TABLET | Refills: 2 | OUTPATIENT
Start: 2023-08-01

## 2023-08-01 RX ORDER — PREGABALIN 150 MG/1
CAPSULE ORAL
Qty: 90 CAPSULE | Refills: 2 | OUTPATIENT
Start: 2023-08-01 | End: 2023-10-26

## 2023-08-01 RX ORDER — PREGABALIN 150 MG/1
CAPSULE ORAL
Qty: 90 CAPSULE | Refills: 2 | Status: SHIPPED | OUTPATIENT
Start: 2023-08-01 | End: 2023-10-30

## 2023-08-01 RX ORDER — VILAZODONE HYDROCHLORIDE 20 MG/1
TABLET ORAL
Qty: 30 TABLET | Refills: 2 | OUTPATIENT
Start: 2023-08-01

## 2023-08-01 NOTE — TELEPHONE ENCOUNTER
Comments: This request is coming from the pharmacy. Last Office Visit (last PCP visit):   5/31/2023    Next Visit Date:  Future Appointments   Date Time Provider [address]   9/21/2023  1:00 PM MARKEL Hartman [address]       If hasn't been seen in over a year OR hasn't followed up according to last diabetes/ADHD visit, make appointment for patient before sending refill to provider.     Rx requested:  Requested Prescriptions     Pending Prescriptions Disp Refills    albuterol sulfate HFA (PROVENTIL;VENTOLIN;PROAIR) 108 (90 Base) MCG/ACT inhaler 18 g 0     Sig: Inhale 2 puffs into the lungs 4 times daily as needed for Wheezing

## 2023-08-01 NOTE — TELEPHONE ENCOUNTER
Comments: This request is coming from the patient. Last Office Visit (last PCP visit):   5/31/2023    Next Visit Date:  Future Appointments   Date Time Provider [address]   9/21/2023  1:00 PM MARKEL Hartman [address]       If hasn't been seen in over a year OR hasn't followed up according to last diabetes/ADHD visit, make appointment for patient before sending refill to provider.     Rx requested:  Requested Prescriptions     Pending Prescriptions Disp Refills    pregabalin (LYRICA) 150 MG capsule 90 capsule 2    vilazodone hcl 20 MG TABS 30 tablet 2     Sig: Take 1 tablet by mouth daily

## 2023-08-02 RX ORDER — ALBUTEROL SULFATE 90 UG/1
2 AEROSOL, METERED RESPIRATORY (INHALATION) 4 TIMES DAILY PRN
Qty: 18 G | Refills: 0 | Status: SHIPPED | OUTPATIENT
Start: 2023-08-02

## 2023-08-26 NOTE — PROGRESS NOTES
Subjective  Prerna ARASELI Hammer, 42 y.o. female presents today with:  Chief Complaint   Patient presents with    Follow-up       HPI   In the office today for 4 week follow up. Last OV with me: 3/21/22. Depression/PTSD--medication changed last OV from prozac to viibryd. Taking 20 mg daily. Has noticed improvement in her mood/feelings. Starting to feel more motivated to make and complete tasks/goals. Denies si/hi. Feeling less emotional/tearful. No side effects. CMT--lyrica started, gabapentin discontinued. Weaned to 75 mg TID. Asking to bump up on medication. Tolerating. Pain/neuropathy seems to be better controlled. Will have intermittent joint pain/stiffness. Asking if there is medication she can take for this joint pain. Chronic migraine. On topamax. Started on nurtec, every other day. Medication working extremely well. Obesity--needs to have labs completed. Review of Systems   Constitutional: Positive for fatigue and unexpected weight change. Negative for activity change, appetite change, chills, diaphoresis and fever. HENT: Negative for congestion, nosebleeds, postnasal drip and trouble swallowing. Eyes: Negative for visual disturbance. Respiratory: Negative for cough, chest tightness, shortness of breath and wheezing. Cardiovascular: Negative for chest pain and leg swelling. Gastrointestinal: Negative for abdominal distention, abdominal pain, blood in stool, constipation, diarrhea, nausea and rectal pain. Genitourinary: Negative for menstrual problem ([facility]) and pelvic pain. Musculoskeletal: Positive for arthralgias, back pain and myalgias. Skin: Negative for color change, pallor and rash. Neurological: Positive for weakness and numbness. Negative for dizziness, tremors, seizures, syncope, facial asymmetry, light-headedness and headaches. Improving   Psychiatric/Behavioral: Positive for sleep disturbance.  Negative for agitation, confusion, decreased concentration, dysphoric mood, hallucinations, self-injury and suicidal ideas. The patient is not nervous/anxious and is not hyperactive. Past Medical History:   Diagnosis Date    Arthritis     Bipolar 1 disorder ([address]     Carpal tunnel syndrome     CMT (Charcot-Melany-Tooth disease)     Depression     Headache      Past Surgical History:   Procedure Laterality Date    HYSTERECTOMY       Social History     Socioeconomic History    Marital status:      Spouse name: Not on file    Number of children: Not on file    Years of education: Not on file    Highest education level: Not on file   Occupational History    Not on file   Tobacco Use    Smoking status: Former Smoker    Smokeless tobacco: Never Used    Tobacco comment: Vaping   Vaping Use    Vaping Use: Every day    Substances: Nicotine   Substance and Sexual Activity    Alcohol use: No    Drug use: No    Sexual activity: Not on file   Other Topics Concern    Not on file   Social History Narrative    Not on file     Social Determinants of Health     Financial Resource Strain: High Risk    Difficulty of Paying Living Expenses: Very hard   Food Insecurity: Food Insecurity Present    Worried About Running Out of Food in the Last Year: Often true    Stevan of Food in the Last Year: Often true   Transportation Needs:     Lack of Transportation (Medical): Not on file    Lack of Transportation (Non-Medical):  Not on file   Physical Activity:     Days of Exercise per Week: Not on file    Minutes of Exercise per Session: Not on file   Stress:     Feeling of Stress : Not on file   Social Connections:     Frequency of Communication with Friends and Family: Not on file    Frequency of Social Gatherings with Friends and Family: Not on file    Attends Orthodox Services: Not on file    Active Member of Clubs or Organizations: Not on file    Attends Club or Organization Meetings: Not on file    Marital Status: Not on file Intimate Partner Violence:     Fear of Current or Ex-Partner: Not on file    Emotionally Abused: Not on file    Physically Abused: Not on file    Sexually Abused: Not on file   Housing Stability:     Unable to Pay for Housing in the Last Year: Not on file    Number of [place] in the Last Year: Not on file    Unstable Housing in the Last Year: Not on file     Family History   Problem Relation Age of Onset    Arthritis Mother     Asthma Mother     Cancer Mother     Diabetes Mother     Heart Disease Mother      No Known Allergies  Current Outpatient Medications   Medication Sig Dispense Refill    pregabalin (LYRICA) 150 MG capsule Take 1 capsule by mouth 3 times daily for 30 days. 90 capsule 0    vilazodone HCl (VILAZODONE HCL) 20 MG TABS Take 1 tablet by mouth daily 30 tablet 2    nabumetone (RELAFEN) 500 MG tablet Take 1 tablet by mouth 2 times daily 60 tablet 3    Rimegepant Sulfate (NURTEC) 75 MG TBDP Take 1 tab po every other day for migraine prevention. 16 tablet 4    albuterol sulfate  (90 Base) MCG/ACT inhaler Inhale 2 puffs into the lungs 4 times daily as needed for Wheezing 18 g 0    topiramate (TOPAMAX) 100 MG tablet Take 1 tablet by mouth 2 times daily 60 tablet 5    promethazine (PHENERGAN) 25 MG tablet Take 1 tablet by mouth every 6 hours as needed for Nausea Take 25 mg by mouth every 6 hours as needed for Nausea 90 tablet 1    famotidine (PEPCID) 20 MG tablet Take 1 tablet by mouth as needed (heartburn) 90 tablet 1     No current facility-administered medications for this visit. PMH, Surgical Hx, Family Hx, and Social Hx reviewed and updated. Health Maintenance reviewed.     Objective  Vitals:    04/14/22 1330   BP: 102/60   Pulse: 84   Weight: 274 lb (124.3 kg)   Height: 5' 3\" (1.6 m)     BP Readings from Last 3 Encounters:   04/14/22 102/60   03/21/22 126/82   11/19/21 120/82     Wt Readings from Last 3 Encounters:   04/14/22 274 lb (124.3 kg)   03/21/22 265 lb (120.2 kg)   11/19/21 253 lb (114.8 kg)     Physical Exam  Vitals reviewed. Constitutional:       General: She is not in acute distress. Appearance: She is obese. She is not ill-appearing, toxic-appearing or diaphoretic. HENT:      Head: Normocephalic and atraumatic. Right Ear: Tympanic membrane, ear canal and external ear normal.      Left Ear: Tympanic membrane, ear canal and external ear normal.      Nose: Nose normal.      Mouth/Throat:      Mouth: Mucous membranes are moist.   Eyes:      Conjunctiva/sclera: Conjunctivae normal.   Cardiovascular:      Rate and Rhythm: Normal rate and regular rhythm. Pulmonary:      Effort: Pulmonary effort is normal.      Breath sounds: Normal breath sounds. Musculoskeletal:         General: Tenderness present. Right lower leg: No edema. Left lower leg: No edema. Neurological:      General: No focal deficit present. Mental Status: She is alert and oriented to person, place, and time. Coordination: Coordination normal.      Gait: Gait normal.   Psychiatric:         Attention and Perception: Attention normal.         Mood and Affect: Mood is anxious and depressed. Affect is tearful. Affect is not blunt or angry. Speech: Speech normal.         Behavior: Behavior normal.         Thought Content: Thought content normal.         Cognition and Memory: Cognition normal.         Judgment: Judgment normal.      Comments: Improved mood noted today. Would like to continue medication. Would like to see therapy to discuss some events of her past.    Motivated to improve things. Assessment & Plan   Prerna was seen today for follow-up. Diagnoses and all orders for this visit:    Mood disorder of depressed type  -     Amb External Referral To Psychology    Neuropathy  -     pregabalin (LYRICA) 150 MG capsule; Take 1 capsule by mouth 3 times daily for 30 days.     PTSD (post-traumatic stress disorder)  -     vilazodone HCl (VILAZODONE HCL) 20 MG TABS; Take 1 tablet by mouth daily  -     Amb External Referral To Psychology    CMT (Charcot-Melany-Tooth disease)  -     nabumetone (RELAFEN) 500 MG tablet; Take 1 tablet by mouth 2 times daily    6-8 week follow up. Lyrica titrated to 150 mg, tid. Continue viibryd at 20 mg. Orders Placed This Encounter   Procedures    Amb External Referral To Psychology     Referral Priority:   Routine     Referral Type:   Consult for Advice and Opinion     Referral Reason:   Specialty Services Required     Referred to Provider:   Jude Felton     Requested Specialty:   Licensed Clinical      Number of Visits Requested:   1     Orders Placed This Encounter   Medications    pregabalin (LYRICA) 150 MG capsule     Sig: Take 1 capsule by mouth 3 times daily for 30 days. Dispense:  90 capsule     Refill:  0    vilazodone HCl (VILAZODONE HCL) 20 MG TABS     Sig: Take 1 tablet by mouth daily     Dispense:  30 tablet     Refill:  2    nabumetone (RELAFEN) 500 MG tablet     Sig: Take 1 tablet by mouth 2 times daily     Dispense:  60 tablet     Refill:  3     Medications Discontinued During This Encounter   Medication Reason    Vilazodone HCl (VIIBRYD STARTER PACK) 10 & 20 MG KIT DOSE ADJUSTMENT    albuterol sulfate  (90 Base) MCG/ACT inhaler DUPLICATE    pregabalin (LYRICA) 75 MG capsule REORDER     No follow-ups on file. Reviewed with the patient: current clinical status, medications, activities and diet. Side effects, adverse effects of the medication prescribed today, as well as treatment plan/ rationale and result expectations have been discussed with the patient who expresses understanding and desires to proceed. Close follow up to evaluate treatment results and for coordination of care. I have reviewed the patient's medical history in detail and updated the computerized patient record.     Michell Callahan PA-C normal sinus rhythm

## 2023-10-23 DIAGNOSIS — K29.70 GASTRITIS WITHOUT BLEEDING, UNSPECIFIED CHRONICITY, UNSPECIFIED GASTRITIS TYPE: ICD-10-CM

## 2023-10-23 DIAGNOSIS — G43.101 MIGRAINE WITH AURA AND WITH STATUS MIGRAINOSUS, NOT INTRACTABLE: ICD-10-CM

## 2023-10-23 DIAGNOSIS — G62.9 NEUROPATHY: ICD-10-CM

## 2023-10-23 RX ORDER — FAMOTIDINE 20 MG/1
20 TABLET, FILM COATED ORAL PRN
Qty: 90 TABLET | Refills: 1 | Status: SHIPPED | OUTPATIENT
Start: 2023-10-23

## 2023-10-23 RX ORDER — PREGABALIN 150 MG/1
CAPSULE ORAL
Qty: 90 CAPSULE | Refills: 2 | Status: SHIPPED | OUTPATIENT
Start: 2023-10-23 | End: 2024-01-21

## 2023-10-23 RX ORDER — PROMETHAZINE HYDROCHLORIDE 25 MG/1
25 TABLET ORAL EVERY 6 HOURS PRN
Qty: 90 TABLET | Refills: 1 | Status: SHIPPED | OUTPATIENT
Start: 2023-10-23

## 2023-10-23 NOTE — TELEPHONE ENCOUNTER
Patient is requesting medication refill.  Please approve or deny this request.    Rx requested:  Requested Prescriptions     Pending Prescriptions Disp Refills    famotidine (PEPCID) 20 MG tablet 90 tablet 1     Sig: Take 1 tablet by mouth as needed (heartburn)    promethazine (PHENERGAN) 25 MG tablet 90 tablet 1     Sig: Take 1 tablet by mouth every 6 hours as needed for Nausea Take 25 mg by mouth every 6 hours as needed for Nausea         Last Office Visit:   5/31/2023      Next Visit Date:  Future Appointments   Date Time Provider [facility] [address]   10/24/2023  1:15 PM Eva Wong, [address] [place] [address]

## 2023-10-24 ENCOUNTER — PATIENT MESSAGE (OUTPATIENT)
Dept: FAMILY MEDICINE CLINIC | Age: 38
End: 2023-10-24

## 2023-10-24 DIAGNOSIS — K59.09 CHRONIC CONSTIPATION: Primary | ICD-10-CM

## 2023-10-25 NOTE — TELEPHONE ENCOUNTER
From: Pawan Trinh  To: Eladio Amezcua Sindy  Sent: 10/24/2023 12:23 PM EDT  Subject: Request for mirilax    Rodrigo Galarza, I've been having issues for months with constipation I've delt with it off and on my entire life my old physician had me on mirilax daily I was wondering if you could please [facility] mirilax I'm super constipated and I can't eat I'm not feeling good . It's just been an ongoing issue for so long I can't afford to buy it everyday. Thankyou for your time hope you and your family are well .  My oldest just turned 24 and our relationship is amazing now

## 2023-10-28 RX ORDER — POLYETHYLENE GLYCOL 3350 17 G/17G
17 POWDER, FOR SOLUTION ORAL DAILY
Qty: 1530 G | Refills: 1 | Status: SHIPPED | OUTPATIENT
Start: 2023-10-28 | End: 2024-04-25

## 2023-11-20 DIAGNOSIS — E88.819 INSULIN RESISTANCE: ICD-10-CM

## 2023-11-22 RX ORDER — DULAGLUTIDE 1.5 MG/.5ML
1.5 INJECTION, SOLUTION SUBCUTANEOUS WEEKLY
Qty: 2 ML | Refills: 5 | Status: SHIPPED | OUTPATIENT
Start: 2023-11-22

## 2023-12-10 DIAGNOSIS — F43.10 PTSD (POST-TRAUMATIC STRESS DISORDER): ICD-10-CM

## 2023-12-10 DIAGNOSIS — K29.70 GASTRITIS WITHOUT BLEEDING, UNSPECIFIED CHRONICITY, UNSPECIFIED GASTRITIS TYPE: ICD-10-CM

## 2023-12-10 DIAGNOSIS — G43.101 MIGRAINE WITH AURA AND WITH STATUS MIGRAINOSUS, NOT INTRACTABLE: ICD-10-CM

## 2023-12-10 DIAGNOSIS — G62.9 NEUROPATHY: ICD-10-CM

## 2023-12-10 DIAGNOSIS — G60.0 CMT (CHARCOT-MARIE-TOOTH DISEASE): ICD-10-CM

## 2023-12-11 NOTE — TELEPHONE ENCOUNTER
Comments:     Last Office Visit (last PCP visit):   5/31/2023    Next Visit Date:  No future appointments. **If hasn't been seen in over a year OR hasn't followed up according to last diabetes/ADHD visit, make appointment for patient before sending refill to provider.     Rx requested:  Requested Prescriptions     Pending Prescriptions Disp Refills    promethazine (PHENERGAN) 25 MG tablet 90 tablet 0     Sig: Take 1 tablet by mouth every 6 hours as needed for Nausea Take 25 mg by mouth every 6 hours as needed for Nausea

## 2023-12-11 NOTE — TELEPHONE ENCOUNTER
Comments:     Last Office Visit (last PCP visit):   5/31/2023    Next Visit Date:  No future appointments. **If hasn't been seen in over a year OR hasn't followed up according to last diabetes/ADHD visit, make appointment for patient before sending refill to provider.     Rx requested:  Requested Prescriptions     Pending Prescriptions Disp Refills    pregabalin (LYRICA) 150 MG capsule 90 capsule 2     Sig: TAKE 1 CAPSULE BY MOUTH IN THE MORNING, 1 capsule at noon, and 1 capsule before bedtime

## 2023-12-11 NOTE — TELEPHONE ENCOUNTER
Comments:     Last Office Visit (last PCP visit):   5/31/2023    Next Visit Date:  No future appointments. **If hasn't been seen in over a year OR hasn't followed up according to last diabetes/ADHD visit, make appointment for patient before sending refill to provider.     Rx requested:  Requested Prescriptions     Pending Prescriptions Disp Refills    famotidine (PEPCID) 20 MG tablet 90 tablet 0     Sig: Take 1 tablet by mouth as needed (heartburn)

## 2023-12-12 RX ORDER — PREGABALIN 150 MG/1
CAPSULE ORAL
Qty: 90 CAPSULE | Refills: 2 | Status: SHIPPED | OUTPATIENT
Start: 2023-12-12 | End: 2024-03-09

## 2023-12-12 RX ORDER — FAMOTIDINE 20 MG/1
20 TABLET, FILM COATED ORAL PRN
Qty: 90 TABLET | Refills: 0 | Status: SHIPPED | OUTPATIENT
Start: 2023-12-12

## 2023-12-12 RX ORDER — VILAZODONE HYDROCHLORIDE 20 MG/1
20 TABLET ORAL DAILY
Qty: 30 TABLET | Refills: 5 | Status: SHIPPED | OUTPATIENT
Start: 2023-12-12

## 2023-12-12 RX ORDER — NABUMETONE 500 MG/1
500 TABLET, FILM COATED ORAL 2 TIMES DAILY
Qty: 60 TABLET | Refills: 3 | Status: SHIPPED | OUTPATIENT
Start: 2023-12-12

## 2023-12-12 RX ORDER — PROMETHAZINE HYDROCHLORIDE 25 MG/1
25 TABLET ORAL EVERY 6 HOURS PRN
Qty: 90 TABLET | Refills: 0 | Status: SHIPPED | OUTPATIENT
Start: 2023-12-12

## 2023-12-12 NOTE — TELEPHONE ENCOUNTER
Comments:     Last Office Visit (last PCP visit):   5/31/2023    Next Visit Date:  No future appointments. **If hasn't been seen in over a year OR hasn't followed up according to last diabetes/ADHD visit, make appointment for patient before sending refill to provider.     Rx requested:  Requested Prescriptions     Pending Prescriptions Disp Refills    vilazodone HCl (VIIBRYD) 20 MG TABS 30 tablet 5     Sig: Take 1 tablet by mouth daily

## 2023-12-12 NOTE — TELEPHONE ENCOUNTER
Comments:     Last Office Visit (last PCP visit):   5/31/2023    Next Visit Date:  No future appointments. **If hasn't been seen in over a year OR hasn't followed up according to last diabetes/ADHD visit, make appointment for patient before sending refill to provider.     Rx requested:  Requested Prescriptions     Pending Prescriptions Disp Refills    nabumetone (RELAFEN) 500 MG tablet 60 tablet 3     Sig: Take 1 tablet by mouth 2 times daily

## 2024-01-24 DIAGNOSIS — G43.101 MIGRAINE WITH AURA AND WITH STATUS MIGRAINOSUS, NOT INTRACTABLE: ICD-10-CM

## 2024-01-26 RX ORDER — PROMETHAZINE HYDROCHLORIDE 25 MG/1
25 TABLET ORAL EVERY 6 HOURS PRN
Qty: 90 TABLET | Refills: 0 | Status: SHIPPED | OUTPATIENT
Start: 2024-01-26

## 2024-01-26 NOTE — TELEPHONE ENCOUNTER
Comments:     Last Office Visit (last PCP visit):   5/31/2023    Next Visit Date:  Future Appointments   Date Time Provider Department Center   2/1/2024  2:00 PM Michell Callahan PA-C [place]  [facility]       **If hasn't been seen in over a year OR hasn't followed up according to last diabetes/ADHD visit, make appointment for patient before sending refill to provider.    Rx requested:  Requested Prescriptions     Pending Prescriptions Disp Refills    promethazine (PHENERGAN) 25 MG tablet 90 tablet 0     Sig: Take 1 tablet by mouth every 6 hours as needed for Nausea Take 25 mg by mouth every 6 hours as needed for Nausea

## 2024-02-01 ENCOUNTER — TELEMEDICINE (OUTPATIENT)
Dept: FAMILY MEDICINE CLINIC | Age: 39
End: 2024-02-01
Payer: MEDICARE

## 2024-02-01 DIAGNOSIS — E88.819 INSULIN RESISTANCE: Primary | ICD-10-CM

## 2024-02-01 DIAGNOSIS — F31.9 BIPOLAR 1 DISORDER, DEPRESSED (HCC): ICD-10-CM

## 2024-02-01 DIAGNOSIS — E88.819 INSULIN RESISTANCE: ICD-10-CM

## 2024-02-01 DIAGNOSIS — E55.9 VITAMIN D DEFICIENCY: ICD-10-CM

## 2024-02-01 DIAGNOSIS — R79.89 ABNORMAL SERUM THYROXINE (T4) LEVEL: ICD-10-CM

## 2024-02-01 DIAGNOSIS — G60.0 CMT (CHARCOT-MARIE-TOOTH DISEASE): ICD-10-CM

## 2024-02-01 DIAGNOSIS — Z12.31 ENCOUNTER FOR SCREENING MAMMOGRAM FOR MALIGNANT NEOPLASM OF BREAST: ICD-10-CM

## 2024-02-01 DIAGNOSIS — R53.82 CHRONIC FATIGUE: ICD-10-CM

## 2024-02-01 DIAGNOSIS — Z13.220 SCREENING CHOLESTEROL LEVEL: ICD-10-CM

## 2024-02-01 PROBLEM — E11.9 TYPE 2 DIABETES MELLITUS (HCC): Status: ACTIVE | Noted: 2024-02-01

## 2024-02-01 PROCEDURE — G8484 FLU IMMUNIZE NO ADMIN: HCPCS | Performed by: PHYSICIAN ASSISTANT

## 2024-02-01 PROCEDURE — 1036F TOBACCO NON-USER: CPT | Performed by: PHYSICIAN ASSISTANT

## 2024-02-01 PROCEDURE — G8427 DOCREV CUR MEDS BY ELIG CLIN: HCPCS | Performed by: PHYSICIAN ASSISTANT

## 2024-02-01 PROCEDURE — 99214 OFFICE O/P EST MOD 30 MIN: CPT | Performed by: PHYSICIAN ASSISTANT

## 2024-02-01 PROCEDURE — G8417 CALC BMI ABV UP PARAM F/U: HCPCS | Performed by: PHYSICIAN ASSISTANT

## 2024-02-01 RX ORDER — SEMAGLUTIDE 0.68 MG/ML
0.25 INJECTION, SOLUTION SUBCUTANEOUS WEEKLY
Qty: 3 ML | Refills: 4 | Status: SHIPPED | OUTPATIENT
Start: 2024-02-01

## 2024-02-01 RX ORDER — CYCLOBENZAPRINE HCL 5 MG
5 TABLET ORAL NIGHTLY PRN
Qty: 30 TABLET | Refills: 2 | Status: SHIPPED | OUTPATIENT
Start: 2024-02-01 | End: 2024-05-01

## 2024-02-01 RX ORDER — TOPIRAMATE 50 MG/1
TABLET, FILM COATED ORAL
Qty: 60 TABLET | Refills: 0 | Status: SHIPPED | OUTPATIENT
Start: 2024-02-01

## 2024-02-01 RX ORDER — MELOXICAM 7.5 MG/1
7.5 TABLET ORAL 2 TIMES DAILY PRN
Qty: 60 TABLET | Refills: 3 | Status: SHIPPED | OUTPATIENT
Start: 2024-02-01

## 2024-02-01 ASSESSMENT — ENCOUNTER SYMPTOMS
CHEST TIGHTNESS: 0
BLOOD IN STOOL: 0
RECTAL PAIN: 0
CONSTIPATION: 0
BACK PAIN: 1
ABDOMINAL PAIN: 0
TROUBLE SWALLOWING: 0
SHORTNESS OF BREATH: 0
ABDOMINAL DISTENTION: 0
COLOR CHANGE: 0
DIARRHEA: 0
WHEEZING: 0
NAUSEA: 0
COUGH: 0

## 2024-02-01 NOTE — PROGRESS NOTES
Prerna Hammer, was evaluated through a synchronous (real-time) audio-video encounter. The patient (or guardian if applicable) is aware that this is a billable service, which includes applicable co-pays. This Virtual Visit was conducted with patient's (and/or legal guardian's) consent. Patient identification was verified, and a caregiver was present when appropriate.   The patient was located at Home: [address] [place], [place] [id number]  Provider was located at Facility (Appt Dept): [address] [place] [address]  [address]  [place] [address]      Prerna Hammer (:  1985) is a Established patient, presenting virtually for evaluation of the following:    Assessment & Plan   Below is the assessment and plan developed based on review of pertinent history, physical exam, labs, studies, and medications.  1. Insulin resistance  -     CBC; Future  -     Comprehensive Metabolic Panel; Future  -     Insulin Free & Total; Future  -     Semaglutide,0.25 or 0.5MG/DOS, (OZEMPIC, 0.25 OR 0.5 MG/DOSE,) 2 MG/3ML SOPN; Inject 0.25 mg into the skin once a week, Disp-3 mL, R-4Normal  2. CMT (Charcot-Melany-Tooth disease)  -     CBC; Future  -     Comprehensive Metabolic Panel; Future  3. Abnormal serum thyroxine (T4) level  4. Chronic fatigue  5. Screening cholesterol level  -     Lipid, Fasting; Future  6. Vitamin D deficiency  -     Vitamin D 25 Hydroxy; Future  7. Bipolar 1 disorder, depressed (HCC)  8. Encounter for screening mammogram for malignant neoplasm of breast  -     EILEEN DIGITAL SCREEN W OR WO CAD BILATERAL; Future    Medications adjusted/changed.  Wants to wean off of topamax.  We will adjust today for her.   Labs to be completed fasting.  OK for mammogram.  3 month follow up.          Subjective   HPI  VV today.  Last OV with me: 23.  Compliant with her medications.  DUe for labs.  Due for mammogram.     Doing well on viibryd therapy.  Mental health feels stable/OK.  Going through some stressors, but feels like she is

## 2024-02-02 RX ORDER — SEMAGLUTIDE 0.68 MG/ML
0.25 INJECTION, SOLUTION SUBCUTANEOUS WEEKLY
Qty: 3 ML | Refills: 4 | OUTPATIENT
Start: 2024-02-02

## 2024-02-29 DIAGNOSIS — G43.101 MIGRAINE WITH AURA AND WITH STATUS MIGRAINOSUS, NOT INTRACTABLE: ICD-10-CM

## 2024-03-02 RX ORDER — RIMEGEPANT SULFATE 75 MG/75MG
TABLET, ORALLY DISINTEGRATING ORAL
Qty: 16 TABLET | Refills: 4 | Status: SHIPPED | OUTPATIENT
Start: 2024-03-02

## 2024-03-04 ENCOUNTER — TELEPHONE (OUTPATIENT)
Dept: FAMILY MEDICINE CLINIC | Age: 39
End: 2024-03-04

## 2024-03-04 NOTE — TELEPHONE ENCOUNTER
Patient states she needs the next higher dose on her Ozempic. DDM in [place].    Patient is asking for a higher milligram of flexeril. Her current dosage is not helping.    Please advise    Callback [phone]

## 2024-03-06 DIAGNOSIS — G43.101 MIGRAINE WITH AURA AND WITH STATUS MIGRAINOSUS, NOT INTRACTABLE: ICD-10-CM

## 2024-03-06 DIAGNOSIS — E88.819 INSULIN RESISTANCE: ICD-10-CM

## 2024-03-08 RX ORDER — CYCLOBENZAPRINE HCL 5 MG
5 TABLET ORAL NIGHTLY PRN
Qty: 30 TABLET | Refills: 2 | Status: SHIPPED | OUTPATIENT
Start: 2024-03-08 | End: 2024-06-06

## 2024-03-08 RX ORDER — PROMETHAZINE HYDROCHLORIDE 25 MG/1
25 TABLET ORAL EVERY 6 HOURS PRN
Qty: 90 TABLET | Refills: 0 | Status: SHIPPED | OUTPATIENT
Start: 2024-03-08

## 2024-03-08 RX ORDER — SEMAGLUTIDE 0.68 MG/ML
0.5 INJECTION, SOLUTION SUBCUTANEOUS WEEKLY
Qty: 3 ML | Refills: 4 | Status: SHIPPED | OUTPATIENT
Start: 2024-03-08

## 2024-04-01 DIAGNOSIS — G62.9 NEUROPATHY: ICD-10-CM

## 2024-04-01 DIAGNOSIS — E88.819 INSULIN RESISTANCE: ICD-10-CM

## 2024-04-03 DIAGNOSIS — G43.101 MIGRAINE WITH AURA AND WITH STATUS MIGRAINOSUS, NOT INTRACTABLE: ICD-10-CM

## 2024-04-03 NOTE — TELEPHONE ENCOUNTER
Semaglutide,0.25 or 0.5MG/DOS, (OZEMPIC, 0.25 OR 0.5 MG/DOSE,) 2 MG/3ML SOPN [Michell Callahan PA-C]      Patient Comment: I'm doing good on the .50 do you want to increase the dose?         cyclobenzaprine (FLEXERIL) 5 MG tablet [Michell Callahan PA-C]      Patient Comment: I'm on 5mg can you increase the dose or switch to a different muscle relaxer please

## 2024-04-03 NOTE — TELEPHONE ENCOUNTER
Comments:     Last Office Visit (last PCP visit):   2/1/2024    Next Visit Date:  Future Appointments   Date Time Provider Department Center   5/2/2024  2:15 PM Michell Callahan PA-C [place]  [facility]       **If hasn't been seen in over a year OR hasn't followed up according to last diabetes/ADHD visit, make appointment for patient before sending refill to provider.    Rx requested:  Requested Prescriptions     Pending Prescriptions Disp Refills    pregabalin (LYRICA) 150 MG capsule 90 capsule 2     Sig: TAKE 1 CAPSULE BY MOUTH IN THE MORNING, 1 capsule at noon, and 1 capsule before bedtime

## 2024-04-04 DIAGNOSIS — G62.9 NEUROPATHY: ICD-10-CM

## 2024-04-04 DIAGNOSIS — E88.819 INSULIN RESISTANCE: ICD-10-CM

## 2024-04-04 DIAGNOSIS — G43.101 MIGRAINE WITH AURA AND WITH STATUS MIGRAINOSUS, NOT INTRACTABLE: ICD-10-CM

## 2024-04-04 RX ORDER — SEMAGLUTIDE 0.68 MG/ML
0.5 INJECTION, SOLUTION SUBCUTANEOUS WEEKLY
Qty: 3 ML | Refills: 4 | OUTPATIENT
Start: 2024-04-04

## 2024-04-04 RX ORDER — PROMETHAZINE HYDROCHLORIDE 25 MG/1
25 TABLET ORAL EVERY 6 HOURS PRN
Qty: 90 TABLET | Refills: 0 | Status: SHIPPED | OUTPATIENT
Start: 2024-04-04

## 2024-04-04 RX ORDER — CYCLOBENZAPRINE HCL 5 MG
5 TABLET ORAL NIGHTLY PRN
Qty: 30 TABLET | Refills: 2 | OUTPATIENT
Start: 2024-04-04 | End: 2024-07-03

## 2024-04-04 RX ORDER — PREGABALIN 150 MG/1
CAPSULE ORAL
Qty: 90 CAPSULE | Refills: 2 | Status: SHIPPED | OUTPATIENT
Start: 2024-04-04 | End: 2024-06-28

## 2024-04-04 RX ORDER — MELOXICAM 7.5 MG/1
7.5 TABLET ORAL 2 TIMES DAILY PRN
Qty: 60 TABLET | Refills: 3 | OUTPATIENT
Start: 2024-04-04

## 2024-04-04 RX ORDER — PROMETHAZINE HYDROCHLORIDE 25 MG/1
25 TABLET ORAL EVERY 6 HOURS PRN
Qty: 90 TABLET | Refills: 0 | Status: SHIPPED | OUTPATIENT
Start: 2024-04-04 | End: 2024-04-04 | Stop reason: SDUPTHER

## 2024-04-04 RX ORDER — CYCLOBENZAPRINE HCL 5 MG
5 TABLET ORAL NIGHTLY PRN
Qty: 30 TABLET | Refills: 2 | Status: SHIPPED | OUTPATIENT
Start: 2024-04-04 | End: 2024-07-03

## 2024-04-04 NOTE — TELEPHONE ENCOUNTER
Comments:     Last Office Visit (last PCP visit):   2/1/2024    Next Visit Date:  Future Appointments   Date Time Provider Department Center   5/2/2024  2:15 PM Michell Callahan PA-C [place]  [facility]       **If hasn't been seen in over a year OR hasn't followed up according to last diabetes/ADHD visit, make appointment for patient before sending refill to provider.    Rx requested:  Requested Prescriptions     Pending Prescriptions Disp Refills    promethazine (PHENERGAN) 25 MG tablet [Pharmacy Med Name: promethazine 25 mg tablet] 90 tablet 0     Sig: TAKE 1 TABLET BY MOUTH EVERY 6 HOURS AS NEEDED FOR NAUSEA

## 2024-04-04 NOTE — TELEPHONE ENCOUNTER
Comments: pt states that she is ready for the increase on current medication. States that she is on .5 currently.     Last Office Visit (last PCP visit):   2/1/2024    Next Visit Date:  Future Appointments   Date Time Provider Department Center   5/2/2024  2:15 PM Michell Callahan PA-C [place]  [facility]       **If hasn't been seen in over a year OR hasn't followed up according to last diabetes/ADHD visit, make appointment for patient before sending refill to provider.    Rx requested:  Requested Prescriptions     Pending Prescriptions Disp Refills    Semaglutide,0.25 or 0.5MG/DOS, (OZEMPIC, 0.25 OR 0.5 MG/DOSE,) 2 MG/3ML SOPN 3 mL 4     Sig: Inject 0.5 mg into the skin once a week

## 2024-04-04 NOTE — TELEPHONE ENCOUNTER
Comments: Please read Pt. Request attached to RX    Last Office Visit (last PCP visit):   2/1/2024    Next Visit Date:  Future Appointments   Date Time Provider Department Center   5/2/2024  2:15 PM Michell Callahan PA-C [place]  [facility]       **If hasn't been seen in over a year OR hasn't followed up according to last diabetes/ADHD visit, make appointment for patient before sending refill to provider.    Rx requested:  Requested Prescriptions     Pending Prescriptions Disp Refills    promethazine (PHENERGAN) 25 MG tablet 90 tablet 0     Sig: Take 1 tablet by mouth every 6 hours as needed for Nausea Take 25 mg by mouth every 6 hours as needed for Nausea    cyclobenzaprine (FLEXERIL) 5 MG tablet 30 tablet 2     Sig: Take 1 tablet by mouth nightly as needed for Muscle spasms

## 2024-04-05 RX ORDER — PREGABALIN 150 MG/1
CAPSULE ORAL
Qty: 90 CAPSULE | Refills: 2 | OUTPATIENT
Start: 2024-04-05

## 2024-04-05 RX ORDER — SEMAGLUTIDE 1.34 MG/ML
1 INJECTION, SOLUTION SUBCUTANEOUS WEEKLY
Qty: 9 ML | Refills: 4 | Status: SHIPPED | OUTPATIENT
Start: 2024-04-05

## 2024-04-05 RX ORDER — SEMAGLUTIDE 0.68 MG/ML
1 INJECTION, SOLUTION SUBCUTANEOUS WEEKLY
Qty: 3 ML | Refills: 4 | Status: SHIPPED | OUTPATIENT
Start: 2024-04-05 | End: 2024-04-05 | Stop reason: DRUGHIGH

## 2024-04-05 NOTE — TELEPHONE ENCOUNTER
Pharmacy calling states that instruction for this med read inject 1mg into skin.    Would like 1 mg pen called to pharmacy so patient does not have to do multiple sticks to achieve correct dosage. - please advise or send NEW script as appropriate

## 2024-04-21 ENCOUNTER — PATIENT MESSAGE (OUTPATIENT)
Dept: FAMILY MEDICINE CLINIC | Age: 39
End: 2024-04-21

## 2024-04-22 NOTE — TELEPHONE ENCOUNTER
From: Prerna Hammer  To: Michell Callahan  Sent: 4/21/2024 6:59 PM EDT  Subject: Flexeril     Rodrigo Galarza I'll stay with the Flexeril with the adjustment thankyou, and I'll go get labs asap and I'll talk to you on may 2nd

## 2024-05-02 ENCOUNTER — TELEMEDICINE (OUTPATIENT)
Dept: FAMILY MEDICINE CLINIC | Age: 39
End: 2024-05-02
Payer: MEDICARE

## 2024-05-02 DIAGNOSIS — K59.04 CHRONIC IDIOPATHIC CONSTIPATION: ICD-10-CM

## 2024-05-02 DIAGNOSIS — E11.9 TYPE 2 DIABETES MELLITUS WITHOUT COMPLICATION, WITHOUT LONG-TERM CURRENT USE OF INSULIN (HCC): ICD-10-CM

## 2024-05-02 DIAGNOSIS — R63.5 WEIGHT GAIN: ICD-10-CM

## 2024-05-02 DIAGNOSIS — M54.50 ACUTE BILATERAL LOW BACK PAIN WITHOUT SCIATICA: ICD-10-CM

## 2024-05-02 DIAGNOSIS — F31.9 BIPOLAR 1 DISORDER, DEPRESSED (HCC): ICD-10-CM

## 2024-05-02 DIAGNOSIS — G89.29 CHRONIC PAIN OF RIGHT KNEE: ICD-10-CM

## 2024-05-02 DIAGNOSIS — G60.0 CMT (CHARCOT-MARIE-TOOTH DISEASE): ICD-10-CM

## 2024-05-02 DIAGNOSIS — M25.561 CHRONIC PAIN OF RIGHT KNEE: ICD-10-CM

## 2024-05-02 PROCEDURE — 3046F HEMOGLOBIN A1C LEVEL >9.0%: CPT | Performed by: PHYSICIAN ASSISTANT

## 2024-05-02 PROCEDURE — G8427 DOCREV CUR MEDS BY ELIG CLIN: HCPCS | Performed by: PHYSICIAN ASSISTANT

## 2024-05-02 PROCEDURE — 99214 OFFICE O/P EST MOD 30 MIN: CPT | Performed by: PHYSICIAN ASSISTANT

## 2024-05-02 PROCEDURE — 2022F DILAT RTA XM EVC RTNOPTHY: CPT | Performed by: PHYSICIAN ASSISTANT

## 2024-05-02 RX ORDER — CYCLOBENZAPRINE HYDROCHLORIDE 7.5 MG/1
7.5 TABLET, FILM COATED ORAL NIGHTLY PRN
Qty: 30 TABLET | Refills: 2 | Status: SHIPPED | OUTPATIENT
Start: 2024-05-02 | End: 2024-07-31

## 2024-05-02 ASSESSMENT — PATIENT HEALTH QUESTIONNAIRE - PHQ9
9. THOUGHTS THAT YOU WOULD BE BETTER OFF DEAD, OR OF HURTING YOURSELF: NOT AT ALL
SUM OF ALL RESPONSES TO PHQ QUESTIONS 1-9: 0
7. TROUBLE CONCENTRATING ON THINGS, SUCH AS READING THE NEWSPAPER OR WATCHING TELEVISION: NOT AT ALL
4. FEELING TIRED OR HAVING LITTLE ENERGY: NOT AT ALL
SUM OF ALL RESPONSES TO PHQ QUESTIONS 1-9: 0
SUM OF ALL RESPONSES TO PHQ9 QUESTIONS 1 & 2: 0
SUM OF ALL RESPONSES TO PHQ QUESTIONS 1-9: 0
2. FEELING DOWN, DEPRESSED OR HOPELESS: NOT AT ALL
10. IF YOU CHECKED OFF ANY PROBLEMS, HOW DIFFICULT HAVE THESE PROBLEMS MADE IT FOR YOU TO DO YOUR WORK, TAKE CARE OF THINGS AT HOME, OR GET ALONG WITH OTHER PEOPLE: NOT DIFFICULT AT ALL
6. FEELING BAD ABOUT YOURSELF - OR THAT YOU ARE A FAILURE OR HAVE LET YOURSELF OR YOUR FAMILY DOWN: NOT AT ALL
8. MOVING OR SPEAKING SO SLOWLY THAT OTHER PEOPLE COULD HAVE NOTICED. OR THE OPPOSITE, BEING SO FIGETY OR RESTLESS THAT YOU HAVE BEEN MOVING AROUND A LOT MORE THAN USUAL: NOT AT ALL
3. TROUBLE FALLING OR STAYING ASLEEP: NOT AT ALL
SUM OF ALL RESPONSES TO PHQ QUESTIONS 1-9: 0
1. LITTLE INTEREST OR PLEASURE IN DOING THINGS: NOT AT ALL
5. POOR APPETITE OR OVEREATING: NOT AT ALL

## 2024-05-02 ASSESSMENT — ENCOUNTER SYMPTOMS
BLOOD IN STOOL: 0
CHEST TIGHTNESS: 0
CONSTIPATION: 1
COUGH: 0
BACK PAIN: 1
RECTAL PAIN: 0
TROUBLE SWALLOWING: 0
ABDOMINAL PAIN: 0
WHEEZING: 0
DIARRHEA: 0
SHORTNESS OF BREATH: 0
COLOR CHANGE: 0
NAUSEA: 0
ABDOMINAL DISTENTION: 1

## 2024-05-02 NOTE — PROGRESS NOTES
Prerna Hammer, was evaluated through a synchronous (real-time) audio-video encounter. The patient (or guardian if applicable) is aware that this is a billable service, which includes applicable co-pays. This Virtual Visit was conducted with patient's (and/or legal guardian's) consent. Patient identification was verified, and a caregiver was present when appropriate.   The patient was located at Home: [address] [place], [place] [address]  Provider was located at Facility (Appt Dept): [address] [facility]  [address]  [place] [address]  Confirm you are appropriately licensed, registered, or certified to deliver care in the state where the patient is located as indicated above. If you are not or unsure, please re-schedule the visit: Yes, I confirm.     Prerna Hammer (:  1985) is a Established patient, presenting virtually for evaluation of the following:    Assessment & Plan   Below is the assessment and plan developed based on review of pertinent history, physical exam, labs, studies, and medications.  1. BMI 45.0-49.9, adult ([facility])  2. Chronic pain of right knee  -     XR KNEE RIGHT (3 VIEWS); Future  3. Type 2 diabetes mellitus without complication, without long-term current use of insulin ([facility])  -     Microalbumin / Creatinine Urine Ratio; Future  4. Acute bilateral low back pain without sciatica  -     XR LUMBAR SPINE (MIN 4 VIEWS); Future  5. Chronic idiopathic constipation  -     linaclotide (LINZESS) 145 MCG capsule; Take 1 capsule by mouth every morning (before breakfast), Disp-30 capsule, R-3Normal    Going for labs next week.  Needs mammogram.    Xray of knee and back.  Activity as tolerated.  Discussed PT.   3 month follow up with me.     No follow-ups on file.       Subjective   HPI  VV today for follow up.  Last OV with me: 24 via vv.  Due to limited transportation.     Weaned herself off of viibryd therapy.  Feels like she is doing better with her emotions.  Feels like she is 'back to

## 2024-05-10 ENCOUNTER — TELEPHONE (OUTPATIENT)
Dept: FAMILY MEDICINE CLINIC | Age: 39
End: 2024-05-10

## 2024-05-10 NOTE — TELEPHONE ENCOUNTER
Patient states her pharmacy does not carry Flexeril 7.5 m.g. and her insurance wouldn't cover it. She is asking for 10 mg to be sent.    Please advise    Callback [phone]

## 2024-05-14 RX ORDER — CYCLOBENZAPRINE HCL 10 MG
10 TABLET ORAL NIGHTLY PRN
Qty: 30 TABLET | Refills: 2 | Status: SHIPPED | OUTPATIENT
Start: 2024-05-14 | End: 2024-08-12

## 2024-06-03 DIAGNOSIS — G43.101 MIGRAINE WITH AURA AND WITH STATUS MIGRAINOSUS, NOT INTRACTABLE: ICD-10-CM

## 2024-06-04 NOTE — TELEPHONE ENCOUNTER
Comments: sending mychart reminder for nov    Last Office Visit (last PCP visit):   5/2/2024    Next Visit Date:  No future appointments.    **If hasn't been seen in over a year OR hasn't followed up according to last diabetes/ADHD visit, make appointment for patient before sending refill to provider.    Rx requested:  Requested Prescriptions     Pending Prescriptions Disp Refills    promethazine (PHENERGAN) 25 MG tablet [Pharmacy Med Name: promethazine 25 mg tablet] 90 tablet 0     Sig: TAKE 1 TABLET BY MOUTH EVERY 6 HOURS AS NEEDED FOR NAUSEA    meloxicam (MOBIC) 7.5 MG tablet [Pharmacy Med Name: meloxicam 7.5 mg tablet] 60 tablet 0     Sig: TAKE 1 TABLET BY MOUTH TWICE DAILY AS NEEDED FOR PAIN

## 2024-06-07 RX ORDER — PROMETHAZINE HYDROCHLORIDE 25 MG/1
25 TABLET ORAL EVERY 6 HOURS PRN
Qty: 90 TABLET | Refills: 0 | Status: SHIPPED | OUTPATIENT
Start: 2024-06-07

## 2024-06-07 RX ORDER — MELOXICAM 7.5 MG/1
7.5 TABLET ORAL 2 TIMES DAILY PRN
Qty: 60 TABLET | Refills: 0 | Status: SHIPPED | OUTPATIENT
Start: 2024-06-07

## 2024-06-26 DIAGNOSIS — G62.9 NEUROPATHY: ICD-10-CM

## 2024-06-26 NOTE — TELEPHONE ENCOUNTER
Comments:     Last Office Visit (last PCP visit):   5/2/2024    Next Visit Date:  No future appointments.    **If hasn't been seen in over a year OR hasn't followed up according to last diabetes/ADHD visit, make appointment for patient before sending refill to provider.    Rx requested:  Requested Prescriptions     Pending Prescriptions Disp Refills    meloxicam (MOBIC) 7.5 MG tablet 60 tablet 0     Sig: Take 1 tablet by mouth 2 times daily as needed

## 2024-06-26 NOTE — TELEPHONE ENCOUNTER
Comments:     Last Office Visit (last PCP visit):   5/2/2024    Next Visit Date:  No future appointments.    **If hasn't been seen in over a year OR hasn't followed up according to last diabetes/ADHD visit, make appointment for patient before sending refill to provider.    Rx requested:  Requested Prescriptions     Pending Prescriptions Disp Refills    pregabalin (LYRICA) 150 MG capsule 90 capsule 2     Sig: TAKE 1 CAPSULE BY MOUTH IN THE MORNING, 1 capsule at noon, and 1 capsule before bedtime

## 2024-06-28 DIAGNOSIS — G43.101 MIGRAINE WITH AURA AND WITH STATUS MIGRAINOSUS, NOT INTRACTABLE: ICD-10-CM

## 2024-06-28 DIAGNOSIS — K29.70 GASTRITIS WITHOUT BLEEDING, UNSPECIFIED CHRONICITY, UNSPECIFIED GASTRITIS TYPE: ICD-10-CM

## 2024-06-28 NOTE — TELEPHONE ENCOUNTER
Comments:     Last Office Visit (last PCP visit):   5/2/2024    Next Visit Date:  Future Appointments   Date Time Provider Department Center   11/18/2024 11:00 AM Michell Callahan PA-C [facility]       **If hasn't been seen in over a year OR hasn't followed up according to last diabetes/ADHD visit, make appointment for patient before sending refill to provider.    Rx requested:  Requested Prescriptions     Pending Prescriptions Disp Refills    promethazine (PHENERGAN) 25 MG tablet 90 tablet 0     Sig: Take 1 tablet by mouth every 6 hours as needed for Nausea for nausea

## 2024-06-28 NOTE — TELEPHONE ENCOUNTER
Comments:     Last Office Visit (last PCP visit):   5/2/2024    Next Visit Date:  Future Appointments   Date Time Provider Department Center   11/18/2024 11:00 AM Michell Callahan PA-C [facility]       **If hasn't been seen in over a year OR hasn't followed up according to last diabetes/ADHD visit, make appointment for patient before sending refill to provider.    Rx requested:  Requested Prescriptions     Pending Prescriptions Disp Refills    famotidine (PEPCID) 20 MG tablet 90 tablet 0     Sig: Take 1 tablet by mouth as needed (heartburn)

## 2024-06-28 NOTE — TELEPHONE ENCOUNTER
Comments:     Last Office Visit (last PCP visit):   5/2/2024    Next Visit Date:  Future Appointments   Date Time Provider Department Center   11/18/2024 11:00 AM Michell Callahan PA-C [facility]  [facility]       **If hasn't been seen in over a year OR hasn't followed up according to last diabetes/ADHD visit, make appointment for patient before sending refill to provider.    Rx requested:  Requested Prescriptions     Pending Prescriptions Disp Refills    cyclobenzaprine (FLEXERIL) 10 MG tablet 30 tablet 2     Sig: Take 1 tablet by mouth nightly as needed for Muscle spasms

## 2024-06-29 RX ORDER — MELOXICAM 7.5 MG/1
7.5 TABLET ORAL 2 TIMES DAILY PRN
Qty: 60 TABLET | Refills: 0 | Status: SHIPPED | OUTPATIENT
Start: 2024-06-29

## 2024-06-29 RX ORDER — PROMETHAZINE HYDROCHLORIDE 25 MG/1
25 TABLET ORAL EVERY 6 HOURS PRN
Qty: 90 TABLET | Refills: 0 | Status: SHIPPED | OUTPATIENT
Start: 2024-06-29

## 2024-06-29 RX ORDER — CYCLOBENZAPRINE HCL 10 MG
10 TABLET ORAL NIGHTLY PRN
Qty: 30 TABLET | Refills: 2 | Status: SHIPPED | OUTPATIENT
Start: 2024-06-29 | End: 2024-09-27

## 2024-06-29 RX ORDER — FAMOTIDINE 20 MG/1
20 TABLET, FILM COATED ORAL PRN
Qty: 90 TABLET | Refills: 0 | Status: SHIPPED | OUTPATIENT
Start: 2024-06-29

## 2024-06-29 RX ORDER — PREGABALIN 150 MG/1
CAPSULE ORAL
Qty: 90 CAPSULE | Refills: 2 | Status: SHIPPED | OUTPATIENT
Start: 2024-06-29 | End: 2024-09-19

## 2024-07-18 ENCOUNTER — TELEPHONE (OUTPATIENT)
Dept: FAMILY MEDICINE CLINIC | Age: 39
End: 2024-07-18

## 2024-07-18 RX ORDER — SEMAGLUTIDE 1.34 MG/ML
1 INJECTION, SOLUTION SUBCUTANEOUS WEEKLY
Qty: 9 ML | Refills: 4 | OUTPATIENT
Start: 2024-07-18

## 2024-07-18 NOTE — TELEPHONE ENCOUNTER
Patient is calling to see what the status of the PA for ozempic.    Please Advise.    # [phone]; ok to leave VM

## 2024-07-23 ENCOUNTER — TELEPHONE (OUTPATIENT)
Dept: FAMILY MEDICINE CLINIC | Age: 39
End: 2024-07-23

## 2024-08-06 DIAGNOSIS — G43.101 MIGRAINE WITH AURA AND WITH STATUS MIGRAINOSUS, NOT INTRACTABLE: ICD-10-CM

## 2024-08-06 RX ORDER — PROMETHAZINE HYDROCHLORIDE 25 MG/1
25 TABLET ORAL EVERY 6 HOURS PRN
Qty: 90 TABLET | Refills: 0 | Status: SHIPPED | OUTPATIENT
Start: 2024-08-06

## 2024-08-08 NOTE — TELEPHONE ENCOUNTER
Comments:     Last Office Visit (last PCP visit):   5/2/2024    Next Visit Date:  Future Appointments   Date Time Provider Department Center   9/23/2024  2:00 PM [facility]   11/18/2024 11:00 AM Michell Callahan PA-C [place] [facility]       **If hasn't been seen in over a year OR hasn't followed up according to last diabetes/ADHD visit, make appointment for patient before sending refill to provider.    Rx requested:  Requested Prescriptions     Pending Prescriptions Disp Refills    meloxicam (MOBIC) 7.5 MG tablet [Pharmacy Med Name: meloxicam 7.5 mg tablet] 60 tablet 0     Sig: TAKE 1 TABLET BY MOUTH TWICE DAILY AS NEEDED FOR PAIN

## 2024-08-09 RX ORDER — MELOXICAM 7.5 MG/1
7.5 TABLET ORAL 2 TIMES DAILY PRN
Qty: 60 TABLET | Refills: 0 | Status: SHIPPED | OUTPATIENT
Start: 2024-08-09

## 2024-09-11 RX ORDER — MELOXICAM 7.5 MG/1
7.5 TABLET ORAL 2 TIMES DAILY PRN
Qty: 60 TABLET | Refills: 0 | Status: SHIPPED | OUTPATIENT
Start: 2024-09-11

## 2024-09-18 DIAGNOSIS — G62.9 NEUROPATHY: ICD-10-CM

## 2024-09-18 RX ORDER — PREGABALIN 150 MG/1
CAPSULE ORAL
Qty: 90 CAPSULE | Refills: 2 | Status: SHIPPED | OUTPATIENT
Start: 2024-09-18 | End: 2024-12-09

## 2024-10-01 RX ORDER — CYCLOBENZAPRINE HCL 10 MG
10 TABLET ORAL NIGHTLY PRN
Qty: 30 TABLET | Refills: 2 | Status: SHIPPED | OUTPATIENT
Start: 2024-10-01 | End: 2024-12-30

## 2024-10-01 NOTE — TELEPHONE ENCOUNTER
Comments:     Last Office Visit (last PCP visit):   5/2/2024    Next Visit Date:  Future Appointments   Date Time Provider Department Center   10/1/2024 11:40 AM [facility]   11/18/2024 11:00 AM Michell Callahan PA-C [place] [facility]       **If hasn't been seen in over a year OR hasn't followed up according to last diabetes/ADHD visit, make appointment for patient before sending refill to provider.    Rx requested:  Requested Prescriptions     Pending Prescriptions Disp Refills    meloxicam (MOBIC) 7.5 MG tablet [Pharmacy Med Name: meloxicam 7.5 mg tablet] 60 tablet 0     Sig: TAKE 1 TABLET BY MOUTH TWICE DAILY AS NEEDED FOR PAIN

## 2024-10-01 NOTE — TELEPHONE ENCOUNTER
Comments:     Last Office Visit (last PCP visit):   5/2/2024    Next Visit Date:  Future Appointments   Date Time Provider Department Center   10/1/2024 11:40 AM [facility] MAMMOGRAPHY ROOM 1 [facility]   11/18/2024 11:00 AM Michell Callahan PA-C [place] [facility]       **If hasn't been seen in over a year OR hasn't followed up according to last diabetes/ADHD visit, make appointment for patient before sending refill to provider.    Rx requested:  Requested Prescriptions     Pending Prescriptions Disp Refills    cyclobenzaprine (FLEXERIL) 10 MG tablet [Pharmacy Med Name: cyclobenzaprine 10 mg tablet] 30 tablet 2     Sig: Take 1 tablet by mouth nightly as needed for Muscle spasms

## 2024-10-02 RX ORDER — MELOXICAM 7.5 MG/1
7.5 TABLET ORAL 2 TIMES DAILY PRN
Qty: 60 TABLET | Refills: 0 | Status: SHIPPED | OUTPATIENT
Start: 2024-10-02

## 2024-11-19 DIAGNOSIS — K29.70 GASTRITIS WITHOUT BLEEDING, UNSPECIFIED CHRONICITY, UNSPECIFIED GASTRITIS TYPE: ICD-10-CM

## 2024-11-19 RX ORDER — MELOXICAM 7.5 MG/1
7.5 TABLET ORAL 2 TIMES DAILY PRN
Qty: 60 TABLET | Refills: 2 | Status: SHIPPED | OUTPATIENT
Start: 2024-11-19

## 2024-11-19 RX ORDER — FAMOTIDINE 20 MG/1
20 TABLET, FILM COATED ORAL PRN
Qty: 90 TABLET | Refills: 2 | Status: SHIPPED | OUTPATIENT
Start: 2024-11-19

## 2024-12-04 RX ORDER — CYCLOBENZAPRINE HCL 10 MG
10 TABLET ORAL NIGHTLY PRN
Qty: 30 TABLET | Refills: 2 | Status: SHIPPED | OUTPATIENT
Start: 2024-12-04 | End: 2025-03-04

## 2024-12-04 RX ORDER — CYCLOBENZAPRINE HCL 5 MG
5 TABLET ORAL NIGHTLY PRN
Qty: 30 TABLET | Refills: 2 | OUTPATIENT
Start: 2024-12-04 | End: 2025-03-04

## 2024-12-04 NOTE — TELEPHONE ENCOUNTER
Comments:     Last Office Visit (last PCP visit):   5/2/2024    Next Visit Date:  Future Appointments   Date Time Provider Department Center   1/21/2025 11:15 AM Michell Callahan PA-C [facility] DEP       **If hasn't been seen in over a year OR hasn't followed up according to last diabetes/ADHD visit, make appointment for patient before sending refill to provider.    Rx requested:  Requested Prescriptions     Pending Prescriptions Disp Refills    cyclobenzaprine (FLEXERIL) 10 MG tablet 30 tablet 2     Sig: Take 1 tablet by mouth nightly as needed for Muscle spasms     Refused Prescriptions Disp Refills    cyclobenzaprine (FLEXERIL) 5 MG tablet [Pharmacy Med Name: cyclobenzaprine 5 mg tablet] 30 tablet 2     Sig: Take 1 tablet by mouth nightly as needed for Muscle spasms

## 2024-12-08 DIAGNOSIS — G62.9 NEUROPATHY: ICD-10-CM

## 2024-12-09 RX ORDER — PREGABALIN 150 MG/1
CAPSULE ORAL
Qty: 90 CAPSULE | Refills: 2 | Status: SHIPPED | OUTPATIENT
Start: 2024-12-09 | End: 2025-03-09

## 2024-12-09 NOTE — TELEPHONE ENCOUNTER
Comments:     Last Office Visit (last PCP visit):   5/2/2024    Next Visit Date:  Future Appointments   Date Time Provider Department Center   1/21/2025 11:15 AM Michell Callahan PA-C [facility] DE[facility]       **If hasn't been seen in over a year OR hasn't followed up according to last diabetes/ADHD visit, make appointment for patient before sending refill to provider.    Rx requested:  Requested Prescriptions     Pending Prescriptions Disp Refills    pregabalin (LYRICA) 150 MG capsule [Pharmacy Med Name: pregabalin 150 mg capsule] 90 capsule 2     Sig: TAKE 1 CAPSULE BY MOUTH IN THE MORNING, 1 CAPSULE AT NOON AND 1 CAPSULE BEFORE BEDTIME

## 2024-12-13 ENCOUNTER — TELEPHONE (OUTPATIENT)
Age: 39
End: 2024-12-13

## 2025-02-08 DIAGNOSIS — G43.101 MIGRAINE WITH AURA AND WITH STATUS MIGRAINOSUS, NOT INTRACTABLE: ICD-10-CM

## 2025-02-10 NOTE — TELEPHONE ENCOUNTER
Comments:     Last Office Visit (last PCP visit):   5/2/2024    Next Visit Date:  Future Appointments   Date Time Provider Department Center   3/26/2025  9:45 AM Michell Callahan PA-C [facility] DE[facility]         Rx requested:  Requested Prescriptions     Pending Prescriptions Disp Refills    promethazine (PHENERGAN) 25 MG tablet [Pharmacy Med Name: promethazine 25 mg tablet] 90 tablet 0     Sig: Take 1 tablet by mouth every 6 hours as needed for Nausea for nausea

## 2025-02-11 RX ORDER — PROMETHAZINE HYDROCHLORIDE 25 MG/1
25 TABLET ORAL EVERY 6 HOURS PRN
Qty: 90 TABLET | Refills: 0 | Status: SHIPPED | OUTPATIENT
Start: 2025-02-11

## 2025-02-13 ENCOUNTER — HOSPITAL ENCOUNTER (EMERGENCY)
Facility: HOSPITAL | Age: 40
Discharge: HOME | End: 2025-02-13
Payer: MEDICARE

## 2025-02-13 ENCOUNTER — APPOINTMENT (OUTPATIENT)
Dept: RADIOLOGY | Facility: HOSPITAL | Age: 40
End: 2025-02-13
Payer: MEDICARE

## 2025-02-13 VITALS
BODY MASS INDEX: 33.66 KG/M2 | RESPIRATION RATE: 12 BRPM | TEMPERATURE: 97 F | OXYGEN SATURATION: 97 % | HEIGHT: 63 IN | WEIGHT: 190 LBS | SYSTOLIC BLOOD PRESSURE: 96 MMHG | DIASTOLIC BLOOD PRESSURE: 55 MMHG | HEART RATE: 65 BPM

## 2025-02-13 DIAGNOSIS — S92.404A CLOSED NONDISPLACED FRACTURE OF PHALANX OF RIGHT GREAT TOE, UNSPECIFIED PHALANX, INITIAL ENCOUNTER: Primary | ICD-10-CM

## 2025-02-13 LAB
AMPHETAMINES UR QL SCN: ABNORMAL
BARBITURATES UR QL SCN: ABNORMAL
BENZODIAZ UR QL SCN: ABNORMAL
BZE UR QL SCN: ABNORMAL
CANNABINOIDS UR QL SCN: ABNORMAL
FENTANYL+NORFENTANYL UR QL SCN: ABNORMAL
METHADONE UR QL SCN: ABNORMAL
OPIATES UR QL SCN: ABNORMAL
OXYCODONE+OXYMORPHONE UR QL SCN: ABNORMAL
PCP UR QL SCN: ABNORMAL

## 2025-02-13 PROCEDURE — 80307 DRUG TEST PRSMV CHEM ANLYZR: CPT | Performed by: NURSE PRACTITIONER

## 2025-02-13 PROCEDURE — 70450 CT HEAD/BRAIN W/O DYE: CPT | Performed by: RADIOLOGY

## 2025-02-13 PROCEDURE — 99285 EMERGENCY DEPT VISIT HI MDM: CPT | Mod: 25

## 2025-02-13 PROCEDURE — 73630 X-RAY EXAM OF FOOT: CPT | Mod: RIGHT SIDE | Performed by: RADIOLOGY

## 2025-02-13 PROCEDURE — 70450 CT HEAD/BRAIN W/O DYE: CPT

## 2025-02-13 PROCEDURE — 2500000001 HC RX 250 WO HCPCS SELF ADMINISTERED DRUGS (ALT 637 FOR MEDICARE OP): Performed by: NURSE PRACTITIONER

## 2025-02-13 PROCEDURE — 73630 X-RAY EXAM OF FOOT: CPT | Mod: RT

## 2025-02-13 RX ORDER — IBUPROFEN 600 MG/1
600 TABLET ORAL ONCE
Status: COMPLETED | OUTPATIENT
Start: 2025-02-13 | End: 2025-02-13

## 2025-02-13 RX ADMIN — IBUPROFEN 600 MG: 600 TABLET ORAL at 08:37

## 2025-02-13 ASSESSMENT — PAIN - FUNCTIONAL ASSESSMENT: PAIN_FUNCTIONAL_ASSESSMENT: 0-10

## 2025-02-13 ASSESSMENT — COLUMBIA-SUICIDE SEVERITY RATING SCALE - C-SSRS
6. HAVE YOU EVER DONE ANYTHING, STARTED TO DO ANYTHING, OR PREPARED TO DO ANYTHING TO END YOUR LIFE?: NO
1. IN THE PAST MONTH, HAVE YOU WISHED YOU WERE DEAD OR WISHED YOU COULD GO TO SLEEP AND NOT WAKE UP?: NO
2. HAVE YOU ACTUALLY HAD ANY THOUGHTS OF KILLING YOURSELF?: NO

## 2025-02-13 ASSESSMENT — LIFESTYLE VARIABLES
HAVE PEOPLE ANNOYED YOU BY CRITICIZING YOUR DRINKING: NO
EVER HAD A DRINK FIRST THING IN THE MORNING TO STEADY YOUR NERVES TO GET RID OF A HANGOVER: NO
TOTAL SCORE: 0
EVER FELT BAD OR GUILTY ABOUT YOUR DRINKING: NO
HAVE YOU EVER FELT YOU SHOULD CUT DOWN ON YOUR DRINKING: NO

## 2025-02-13 ASSESSMENT — PAIN SCALES - GENERAL
PAINLEVEL_OUTOF10: 8
PAINLEVEL_OUTOF10: 7

## 2025-02-13 ASSESSMENT — PAIN DESCRIPTION - LOCATION: LOCATION: FOOT

## 2025-02-13 ASSESSMENT — PAIN DESCRIPTION - DESCRIPTORS: DESCRIPTORS: BURNING

## 2025-02-13 ASSESSMENT — PAIN DESCRIPTION - PAIN TYPE: TYPE: ACUTE PAIN

## 2025-02-13 NOTE — ED PROVIDER NOTES
HPI   Chief Complaint   Patient presents with    Foot Injury     Hit foot tonight     Head Injury     Hit head on car door x 2 weeks ago. Head hurt since.          39-year-old female presents emergency department, presents with multiple complaints.  States she hit her head on the door to try to get in a week ago.  States she has had headaches since then she also complains of throbbing pain in her right foot, about the great toe.  States she has been able to sleep for 2 nights because of the throbbing which is why she is so tired right now.    Denies alcohol or drug use      History provided by:  Patient   used: No            Patient History   Past Medical History:   Diagnosis Date    Personal history of other diseases of the nervous system and sense organs 08/05/2015    History of migraine    Personal history of other mental and behavioral disorders 08/05/2015    History of bipolar disorder    Personal history of other mental and behavioral disorders 08/05/2015    History of mental disorder    Personal history of other specified conditions     History of diarrhea     Past Surgical History:   Procedure Laterality Date    COLONOSCOPY  05/16/2016    Complete Colonoscopy    HYSTERECTOMY  08/05/2015    Hysterectomy    LAPAROSCOPY DIAGNOSTIC / BIOPSY / ASPIRATION / LYSIS  05/16/2016    Laparoscopy (Diagnostic)     No family history on file.  Social History     Tobacco Use    Smoking status: Not on file    Smokeless tobacco: Not on file   Substance Use Topics    Alcohol use: Not on file    Drug use: Not on file       Physical Exam   ED Triage Vitals [02/13/25 0503]   Temperature Heart Rate Respirations BP   36.1 °C (97 °F) 81 18 119/80      Pulse Ox Temp Source Heart Rate Source Patient Position   96 % Temporal Monitor --      BP Location FiO2 (%)     -- --       Physical Exam  Constitutional: Vitals noted, no distress. Afebrile.   Head: Atraumatic, normocephalic  Cardiovascular: Regular, rate, rhythm,  no murmur.   Pulmonary: Lungs clear bilaterally with good aeration. No adventitious breath sounds.   Gastrointestinal: Soft, nonsurgical. Nontender. No peritoneal signs. Normoactive bowel sounds.   Musculoskeletal:  right foot, about the great toe there is moderate amount of swelling and erythema-mostly at the MPJ  Skin: No rash.   Neuro: No focal neurologic deficits, NIH score of 0.      ED Course & MDM                  No data recorded     [place] Coma Scale Score: 15 (02/13/25 0506 : Juanita Mcdaniel RN)                           Medical Decision Making      Procedure  Procedures   questions should be directed to the laboratory medical directors.        CT head wo IV contrast   Final Result   No CT evidence of acute intracranial abnormality.             MACRO:   None        Signed by: Beck Ras 2/13/2025 7:37 AM   Dictation workstation:   [id number]      XR foot right 3+ views   Final Result   Right foot soft tissue swelling.   Linear lucency within the tuft of the distal phalanx may represent a   nondisplaced fracture.  Recommend correlation with site of pain.   Signed by Benjamin New MD                    No data recorded     [place] Coma Scale Score: 15 (02/13/25 0506 : Juanita Mcdnaiel RN)                   Medical Decision Making  X-ray imaging ordered, does show distal phalanx fracture of the first digit, placed in postop shoe.  Head CT unremarkable for acute intracranial abnormality.    The correct placement of the splint/strap was confirmed.  Any necessary adjustments were made.  The patient´s neurovascular status is intact pre and post placement.        I personally supervised and inspected the splint/strap which was applied by RN. The extremity is appropriately immobilized. Patient neurovascularly intact before and after the splint application.        Patient did test positive for multiple substances on urine drug screen.  The substance abuse navigator was consulted, evaluated the patient.    Ultimately discharged home with plan of care arranged with substance abuse navigator.  Ibuprofen for pain, follow-up with Ortho.    Procedure  Procedures     Elmira Vidal, ALONSO-CNP  02/17/25 0910

## 2025-02-13 NOTE — PROGRESS NOTES
Parris Pinto was assessed by a Substance Use Navigator Specialist (SUNS) at 9:31 AM     Contact Number obtained during encounter:     Medical History:   Past Medical History:   Diagnosis Date    Personal history of other diseases of the nervous system and sense organs 08/05/2015    History of migraine    Personal history of other mental and behavioral disorders 08/05/2015    History of bipolar disorder    Personal history of other mental and behavioral disorders 08/05/2015    History of mental disorder    Personal history of other specified conditions     History of diarrhea        Psychiatric History: depression and PTSD    Social History:   Social History     Socioeconomic History    Marital status: Single     Spouse name: Not on file    Number of children: Not on file    Years of education: Not on file    Highest education level: Not on file   Occupational History    Not on file   Tobacco Use    Smoking status: Not on file    Smokeless tobacco: Not on file   Substance and Sexual Activity    Alcohol use: Not on file    Drug use: Not on file    Sexual activity: Not on file   Other Topics Concern    Not on file   Social History Narrative    Not on file     Social Drivers of Health     Financial Resource Strain: High Risk (8/9/2021)    Received from [facility]    Overall Financial Resource Strain (CARDIA)     Difficulty of Paying Living Expenses: Very hard   Food Insecurity: Not on file (5/31/2023)   Transportation Needs: Not on file   Physical Activity: Not on file   Stress: Not on file   Social Connections: Not on file   Intimate Partner Violence: Not on file   Housing Stability: Not on file        UDS / Tox panel results:   THC, cocaine, fentanyl,opiates.     Substance(s) used: FENTANYL, COCAINE, OPIATES     Brief Summary of Assessment:   ROXANNE VEE asked MARTIN to talk with pt. Pt shared they have been struggling with bill, mental health, food security, and substance abuse. Pt said they  relapsed a month ago and they last used a week ago. Pt said they have been struggling to sleep for the past 3 nights. Pt said they just ended their relationship with their partner of 5 years a couple days ago. Pt shared they have been going to the [facility] for methadone. Pt said they are not connected with a mental health clinic  and not on mental health meds. Pt said they have recently received shut off notices for their bills int he mail. Pt said they are in need of food and clothing resources.     Diagnosis / Diagnostic Impression:   Polysubstance abuse disorder     Summary / Plan:  SUNS gave pt a folder of resources (HEAP / PIPP application, food pantries, [facility] walk in hours, SUNS card).     Patient interested in treatment: Yes        Transportation Provided:

## 2025-02-25 ENCOUNTER — TELEPHONE (OUTPATIENT)
Dept: CASE MANAGEMENT | Facility: HOSPITAL | Age: 40
End: 2025-02-25
Payer: MEDICARE

## 2025-02-25 NOTE — TELEPHONE ENCOUNTER
This is an audio only call. This audio call was conducted in the [facility].     Substance Use Navigator Specialist (MARTIN) performed an audio only follow-up call with Parris Pinto at 12:00 PM     Was SUNS able to reach patient? Yes    If No, did MARTIN leave a voicemail message?     Brief summary of call:  MARTIN talked with pt. Pt shared they are stopping methadone and starting on subxone through [employer] services. Pt said their last methadone dose was on 2/21. Pt said they have felt sick due to the transition to the suboxone. Pt shared they are going to go to Psych and Psych this week as well.     Follow-up / Next steps:   SUNS will call pt in a couple weeks,.   End of call time:   12:00 pm  Duration of audio encounter: SUNS Audio call - Minutes: 5-10 minutes

## 2025-03-07 DIAGNOSIS — G62.9 NEUROPATHY: ICD-10-CM

## 2025-03-07 RX ORDER — PREGABALIN 150 MG/1
CAPSULE ORAL
Qty: 90 CAPSULE | Refills: 2 | Status: SHIPPED | OUTPATIENT
Start: 2025-03-07 | End: 2025-06-05

## 2025-03-07 NOTE — TELEPHONE ENCOUNTER
Comments:     Last Office Visit (last PCP visit):   5/2/2024    Next Visit Date:  Future Appointments   Date Time Provider Department Center   3/26/2025  9:45 AM Michell Callahan PA-C [facility] DEP         Rx requested:  Requested Prescriptions     Pending Prescriptions Disp Refills    pregabalin (LYRICA) 150 MG capsule [Pharmacy Med Name: pregabalin 150 mg capsule] 90 capsule 2     Sig: TAKE 1 CAPSULE BY MOUTH IN THE MORNING, 1 capsule at noon, and 1 capsule before bedtime

## 2025-03-26 ENCOUNTER — TELEPHONE (OUTPATIENT)
Age: 40
End: 2025-03-26

## 2025-04-02 ENCOUNTER — TELEPHONE (OUTPATIENT)
Age: 40
End: 2025-04-02

## 2025-04-02 ENCOUNTER — TELEPHONE (OUTPATIENT)
Dept: CASE MANAGEMENT | Facility: HOSPITAL | Age: 40
End: 2025-04-02
Payer: MEDICARE

## 2025-04-02 NOTE — TELEPHONE ENCOUNTER
This is an audio only call. This audio call was conducted in the [facility].     Substance Use Navigator Specialist (MARTIN) performed an audio only follow-up call with Parris Pinto at 3:18 PM     Was SUNS able to reach patient? No    If No, did MARTIN leave a voicemail message? Yes suns left call back number and asked for a call back     Brief summary of call:    Follow-up / Next steps:     End of call time:     Duration of audio encounter:

## 2025-04-06 DIAGNOSIS — E88.819 INSULIN RESISTANCE: ICD-10-CM

## 2025-04-06 DIAGNOSIS — G43.101 MIGRAINE WITH AURA AND WITH STATUS MIGRAINOSUS, NOT INTRACTABLE: ICD-10-CM

## 2025-04-07 DIAGNOSIS — E11.9 TYPE 2 DIABETES MELLITUS WITHOUT COMPLICATIONS: ICD-10-CM

## 2025-04-07 DIAGNOSIS — G43.101 MIGRAINE WITH AURA AND WITH STATUS MIGRAINOSUS, NOT INTRACTABLE: ICD-10-CM

## 2025-04-07 RX ORDER — SEMAGLUTIDE 0.68 MG/ML
0.5 INJECTION, SOLUTION SUBCUTANEOUS WEEKLY
Qty: 3 ML | Refills: 4 | OUTPATIENT
Start: 2025-04-07

## 2025-04-07 RX ORDER — RIMEGEPANT SULFATE 75 MG/75MG
TABLET, ORALLY DISINTEGRATING ORAL
Qty: 16 TABLET | Refills: 4 | OUTPATIENT
Start: 2025-04-07

## 2025-04-07 RX ORDER — MELOXICAM 7.5 MG/1
7.5 TABLET ORAL 2 TIMES DAILY PRN
Qty: 60 TABLET | Refills: 2 | OUTPATIENT
Start: 2025-04-07

## 2025-04-07 RX ORDER — SEMAGLUTIDE 1.34 MG/ML
1 INJECTION, SOLUTION SUBCUTANEOUS WEEKLY
Qty: 9 ML | Refills: 4 | OUTPATIENT
Start: 2025-04-07

## 2025-04-07 NOTE — TELEPHONE ENCOUNTER
Comments:     Last Office Visit (last PCP visit):   5/2/2024    Next Visit Date:  No future appointments.      Rx requested:  Requested Prescriptions     Pending Prescriptions Disp Refills    rimegepant sulfate (NURTEC) 75 MG TBDP [Pharmacy Med Name: Nurtec ODT 75 mg disintegrating tablet] 16 tablet 4     Sig: dissolve 1 TABLET BY MOUTH EVERY OTHER DAY FOR MIGRAINE prevention    meloxicam (MOBIC) 7.5 MG tablet [Pharmacy Med Name: meloxicam 7.5 mg tablet] 60 tablet 2     Sig: TAKE 1 TABLET BY MOUTH TWICE DAILY AS NEEDED FOR PAIN     Refused Prescriptions Disp Refills    OZEMPIC, 0.25 OR 0.5 MG/DOSE, 2 MG/3ML SOPN [Pharmacy Med Name: Ozempic 0.25 mg or 0.5 mg (2 mg/3 mL) subcutaneous pen injector] 3 mL 4     Sig: Inject 0.5 mg into the skin once a week

## 2025-04-09 ENCOUNTER — TELEPHONE (OUTPATIENT)
Age: 40
End: 2025-04-09

## 2025-04-11 ENCOUNTER — OFFICE VISIT (OUTPATIENT)
Age: 40
End: 2025-04-11
Payer: MEDICARE

## 2025-04-11 VITALS
DIASTOLIC BLOOD PRESSURE: 80 MMHG | SYSTOLIC BLOOD PRESSURE: 120 MMHG | OXYGEN SATURATION: 97 % | WEIGHT: 189 LBS | BODY MASS INDEX: 33.49 KG/M2 | HEIGHT: 63 IN | HEART RATE: 94 BPM

## 2025-04-11 DIAGNOSIS — E88.819 INSULIN RESISTANCE: ICD-10-CM

## 2025-04-11 DIAGNOSIS — G43.101 MIGRAINE WITH AURA AND WITH STATUS MIGRAINOSUS, NOT INTRACTABLE: ICD-10-CM

## 2025-04-11 DIAGNOSIS — R13.10 DYSPHAGIA, UNSPECIFIED TYPE: ICD-10-CM

## 2025-04-11 DIAGNOSIS — K59.04 CHRONIC IDIOPATHIC CONSTIPATION: ICD-10-CM

## 2025-04-11 DIAGNOSIS — G60.0 CMT (CHARCOT-MARIE-TOOTH DISEASE): ICD-10-CM

## 2025-04-11 DIAGNOSIS — R23.3 EASY BRUISING: ICD-10-CM

## 2025-04-11 DIAGNOSIS — Z12.31 ENCOUNTER FOR SCREENING MAMMOGRAM FOR BREAST CANCER: ICD-10-CM

## 2025-04-11 DIAGNOSIS — F31.9 BIPOLAR 1 DISORDER, DEPRESSED (HCC): ICD-10-CM

## 2025-04-11 DIAGNOSIS — E11.9 TYPE 2 DIABETES MELLITUS WITHOUT COMPLICATION, WITHOUT LONG-TERM CURRENT USE OF INSULIN: Primary | ICD-10-CM

## 2025-04-11 DIAGNOSIS — R53.83 OTHER FATIGUE: ICD-10-CM

## 2025-04-11 DIAGNOSIS — M23.91 INTERNAL DERANGEMENT OF KNEE JOINT, RIGHT: ICD-10-CM

## 2025-04-11 PROCEDURE — 99214 OFFICE O/P EST MOD 30 MIN: CPT | Performed by: PHYSICIAN ASSISTANT

## 2025-04-11 RX ORDER — BUPRENORPHINE AND NALOXONE 8; 2 MG/1; MG/1
FILM, SOLUBLE BUCCAL; SUBLINGUAL
COMMUNITY
Start: 2025-02-27

## 2025-04-11 RX ORDER — MELOXICAM 7.5 MG/1
7.5 TABLET ORAL 2 TIMES DAILY PRN
Qty: 60 TABLET | Refills: 2 | Status: SHIPPED | OUTPATIENT
Start: 2025-04-11

## 2025-04-11 RX ORDER — RIMEGEPANT SULFATE 75 MG/75MG
TABLET, ORALLY DISINTEGRATING ORAL
Qty: 16 TABLET | Refills: 1 | Status: SHIPPED | OUTPATIENT
Start: 2025-04-11

## 2025-04-11 RX ORDER — SEMAGLUTIDE 1.34 MG/ML
1 INJECTION, SOLUTION SUBCUTANEOUS WEEKLY
Qty: 3 ML | Refills: 2 | Status: SHIPPED | OUTPATIENT
Start: 2025-04-11

## 2025-04-11 RX ORDER — CYCLOBENZAPRINE HCL 10 MG
10 TABLET ORAL NIGHTLY PRN
COMMUNITY
Start: 2025-04-06

## 2025-04-11 RX ORDER — ALBUTEROL SULFATE 90 UG/1
2 INHALANT RESPIRATORY (INHALATION) 4 TIMES DAILY PRN
Qty: 18 G | Refills: 0 | Status: SHIPPED | OUTPATIENT
Start: 2025-04-11

## 2025-04-11 RX ORDER — CLONIDINE HYDROCHLORIDE 0.2 MG/1
TABLET ORAL
COMMUNITY
Start: 2025-04-07

## 2025-04-11 SDOH — ECONOMIC STABILITY: FOOD INSECURITY: WITHIN THE PAST 12 MONTHS, THE FOOD YOU BOUGHT JUST DIDN'T LAST AND YOU DIDN'T HAVE MONEY TO GET MORE.: NEVER TRUE

## 2025-04-11 SDOH — ECONOMIC STABILITY: FOOD INSECURITY: WITHIN THE PAST 12 MONTHS, YOU WORRIED THAT YOUR FOOD WOULD RUN OUT BEFORE YOU GOT MONEY TO BUY MORE.: NEVER TRUE

## 2025-04-11 ASSESSMENT — ENCOUNTER SYMPTOMS
VOMITING: 0
CHEST TIGHTNESS: 1
NAUSEA: 0
DIARRHEA: 0
COUGH: 0
SINUS PAIN: 0
SORE THROAT: 0
BACK PAIN: 0
CHOKING: 0
SHORTNESS OF BREATH: 0
TROUBLE SWALLOWING: 1
ABDOMINAL PAIN: 0
WHEEZING: 1
SINUS PRESSURE: 0

## 2025-04-11 ASSESSMENT — PATIENT HEALTH QUESTIONNAIRE - PHQ9
SUM OF ALL RESPONSES TO PHQ QUESTIONS 1-9: 10
SUM OF ALL RESPONSES TO PHQ QUESTIONS 1-9: 10
4. FEELING TIRED OR HAVING LITTLE ENERGY: MORE THAN HALF THE DAYS
6. FEELING BAD ABOUT YOURSELF - OR THAT YOU ARE A FAILURE OR HAVE LET YOURSELF OR YOUR FAMILY DOWN: MORE THAN HALF THE DAYS
8. MOVING OR SPEAKING SO SLOWLY THAT OTHER PEOPLE COULD HAVE NOTICED. OR THE OPPOSITE, BEING SO FIGETY OR RESTLESS THAT YOU HAVE BEEN MOVING AROUND A LOT MORE THAN USUAL: NOT AT ALL
2. FEELING DOWN, DEPRESSED OR HOPELESS: SEVERAL DAYS
5. POOR APPETITE OR OVEREATING: SEVERAL DAYS
7. TROUBLE CONCENTRATING ON THINGS, SUCH AS READING THE NEWSPAPER OR WATCHING TELEVISION: SEVERAL DAYS
10. IF YOU CHECKED OFF ANY PROBLEMS, HOW DIFFICULT HAVE THESE PROBLEMS MADE IT FOR YOU TO DO YOUR WORK, TAKE CARE OF THINGS AT HOME, OR GET ALONG WITH OTHER PEOPLE: NOT DIFFICULT AT ALL
SUM OF ALL RESPONSES TO PHQ QUESTIONS 1-9: 10
SUM OF ALL RESPONSES TO PHQ QUESTIONS 1-9: 10
3. TROUBLE FALLING OR STAYING ASLEEP: MORE THAN HALF THE DAYS
1. LITTLE INTEREST OR PLEASURE IN DOING THINGS: SEVERAL DAYS
9. THOUGHTS THAT YOU WOULD BE BETTER OFF DEAD, OR OF HURTING YOURSELF: NOT AT ALL

## 2025-04-11 ASSESSMENT — VISUAL ACUITY: OU: 1

## 2025-04-11 NOTE — PROGRESS NOTES
Subjective  Prerna Hammer 1985 is a 40 y.o. female who presents today with:  Chief Complaint   Patient presents with    Medication Refill     Pt here for med refills   Michell Pt      Routinely followed by Michell Callahan PA-C. Last seen with JL Callahan on 05/02/24 virtually. Here for refills on medication.       HPI    History of Present Illness  The patient presents for evaluation of insulin resistance, right knee pain, left knee crepitus, easy bruising, muscle knot on her back, dysphagia, asthma, and head injury.    Insulin Resistance/DM  - on Ozempic 1 mg  - unable to take metformin  The patient has been managing her insulin resistance with Ozempic 1 mg but discontinued the medication approximately 1.5 weeks ago. Since then, intense sugar cravings have been experienced. Prior to her diagnosis of insulin resistance, a high-sugar diet was consumed, including cakes and candy for all meals. The Ozempic significantly reduced her sugar cravings, although a slight increase in cravings was noticed towards the end of her treatment. Weight loss from 280 pounds to 190 pounds is reported.    Right Knee Pain  Significant issues with the right knee are reported, described as feeling abnormal. The onset of these symptoms was a few months ago, characterized by a grinding sensation in the knee. Severe pain in the back of the knee after prolonged sitting is experienced, localized to the lateral side of the knee. The pain is described as a burning sensation, accompanied by numbness and tingling in the foot. A cracking sound in the knee is also reported, but no recent injuries. No therapy has been undergone for this condition. 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 No surgeries or previous issues with the knee are reported. Occasional popping and grinding sensations in the left knee are also reported, which locks up at  all other ROS negative except as per HPI

## 2025-05-01 ENCOUNTER — COMMUNITY OUTREACH (OUTPATIENT)
Age: 40
End: 2025-05-01

## 2025-06-03 DIAGNOSIS — G62.9 NEUROPATHY: ICD-10-CM

## 2025-06-03 DIAGNOSIS — K29.70 GASTRITIS WITHOUT BLEEDING, UNSPECIFIED CHRONICITY, UNSPECIFIED GASTRITIS TYPE: ICD-10-CM

## 2025-06-03 DIAGNOSIS — M62.838 MUSCLE SPASM: ICD-10-CM

## 2025-06-03 DIAGNOSIS — G60.0 CMT (CHARCOT-MARIE-TOOTH DISEASE): Primary | ICD-10-CM

## 2025-06-03 NOTE — TELEPHONE ENCOUNTER
Comments: Last refill 4/6/25    Last Office Visit (last PCP visit):   5/2/2024    Next Visit Date:  Future Appointments   Date Time Provider Department Center   6/20/2025 10:00 AM Michell Callahan PA-C [facility] DE[facility]       **If hasn't been seen in over a year OR hasn't followed up according to last diabetes/ADHD visit, make appointment for patient before sending refill to provider.    Rx requested:  Requested Prescriptions     Pending Prescriptions Disp Refills    cyclobenzaprine (FLEXERIL) 10 MG tablet       Sig: Take 1 tablet by mouth nightly as needed for Muscle spasms for muscle spasms

## 2025-06-04 ENCOUNTER — HOSPITAL ENCOUNTER (INPATIENT)
Facility: HOSPITAL | Age: 40
LOS: 2 days | Discharge: LEFT BEFORE DISCHARGE | End: 2025-06-07
Attending: STUDENT IN AN ORGANIZED HEALTH CARE EDUCATION/TRAINING PROGRAM | Admitting: STUDENT IN AN ORGANIZED HEALTH CARE EDUCATION/TRAINING PROGRAM
Payer: MEDICARE

## 2025-06-04 ENCOUNTER — APPOINTMENT (OUTPATIENT)
Dept: CARDIOLOGY | Facility: HOSPITAL | Age: 40
End: 2025-06-04
Payer: MEDICARE

## 2025-06-04 ENCOUNTER — APPOINTMENT (OUTPATIENT)
Dept: RADIOLOGY | Facility: HOSPITAL | Age: 40
End: 2025-06-04
Payer: MEDICARE

## 2025-06-04 DIAGNOSIS — J98.4 PNEUMONITIS: Primary | ICD-10-CM

## 2025-06-04 LAB
ALBUMIN SERPL BCP-MCNC: 4.4 G/DL (ref 3.4–5)
ALP SERPL-CCNC: 68 U/L (ref 33–110)
ALT SERPL W P-5'-P-CCNC: 6 U/L (ref 7–45)
ANION GAP BLDV CALCULATED.4IONS-SCNC: 11 MMOL/L (ref 10–25)
ANION GAP SERPL CALC-SCNC: 12 MMOL/L (ref 10–20)
AST SERPL W P-5'-P-CCNC: 17 U/L (ref 9–39)
ATRIAL RATE: 117 BPM
B-HCG SERPL-ACNC: <2 MIU/ML
BASE EXCESS BLDV CALC-SCNC: 1.6 MMOL/L (ref -2–3)
BASOPHILS # BLD AUTO: 0.05 X10*3/UL (ref 0–0.1)
BASOPHILS NFR BLD AUTO: 0.4 %
BILIRUB SERPL-MCNC: 0.7 MG/DL (ref 0–1.2)
BNP SERPL-MCNC: 41 PG/ML (ref 0–99)
BODY TEMPERATURE: ABNORMAL
BUN SERPL-MCNC: 13 MG/DL (ref 6–23)
CA-I BLDV-SCNC: 1.21 MMOL/L (ref 1.1–1.33)
CALCIUM SERPL-MCNC: 9.3 MG/DL (ref 8.6–10.3)
CARDIAC TROPONIN I PNL SERPL HS: 5 NG/L (ref 0–13)
CARDIAC TROPONIN I PNL SERPL HS: 5 NG/L (ref 0–13)
CHLORIDE BLDV-SCNC: 101 MMOL/L (ref 98–107)
CHLORIDE SERPL-SCNC: 104 MMOL/L (ref 98–107)
CO2 SERPL-SCNC: 23 MMOL/L (ref 21–32)
CREAT SERPL-MCNC: 0.53 MG/DL (ref 0.5–1.05)
D DIMER PPP FEU-MCNC: 311 NG/ML FEU
EGFRCR SERPLBLD CKD-EPI 2021: >90 ML/MIN/1.73M*2
EOSINOPHIL # BLD AUTO: 0.02 X10*3/UL (ref 0–0.7)
EOSINOPHIL NFR BLD AUTO: 0.2 %
ERYTHROCYTE [DISTWIDTH] IN BLOOD BY AUTOMATED COUNT: 15.2 % (ref 11.5–14.5)
FLUAV RNA RESP QL NAA+PROBE: NOT DETECTED
FLUBV RNA RESP QL NAA+PROBE: NOT DETECTED
GLUCOSE BLDV-MCNC: 99 MG/DL (ref 74–99)
GLUCOSE SERPL-MCNC: 94 MG/DL (ref 74–99)
HCO3 BLDV-SCNC: 27.7 MMOL/L (ref 22–26)
HCT VFR BLD AUTO: 41.4 % (ref 36–46)
HCT VFR BLD EST: 44 % (ref 36–46)
HGB BLD-MCNC: 13.9 G/DL (ref 12–16)
HGB BLDV-MCNC: 14.7 G/DL (ref 12–16)
IMM GRANULOCYTES # BLD AUTO: 0.04 X10*3/UL (ref 0–0.7)
IMM GRANULOCYTES NFR BLD AUTO: 0.3 % (ref 0–0.9)
INHALED O2 CONCENTRATION: 27 %
INR PPP: 1.2 (ref 0.9–1.1)
LACTATE BLDV-SCNC: 1.6 MMOL/L (ref 0.4–2)
LYMPHOCYTES # BLD AUTO: 1.86 X10*3/UL (ref 1.2–4.8)
LYMPHOCYTES NFR BLD AUTO: 15.4 %
MAGNESIUM SERPL-MCNC: 1.83 MG/DL (ref 1.6–2.4)
MCH RBC QN AUTO: 28.1 PG (ref 26–34)
MCHC RBC AUTO-ENTMCNC: 33.6 G/DL (ref 32–36)
MCV RBC AUTO: 84 FL (ref 80–100)
MONOCYTES # BLD AUTO: 0.65 X10*3/UL (ref 0.1–1)
MONOCYTES NFR BLD AUTO: 5.4 %
NEUTROPHILS # BLD AUTO: 9.42 X10*3/UL (ref 1.2–7.7)
NEUTROPHILS NFR BLD AUTO: 78.3 %
NRBC BLD-RTO: 0 /100 WBCS (ref 0–0)
OXYHGB MFR BLDV: 61 % (ref 45–75)
P AXIS: 36 DEGREES
P OFFSET: 196 MS
P ONSET: 147 MS
PCO2 BLDV: 48 MM HG (ref 41–51)
PH BLDV: 7.37 PH (ref 7.33–7.43)
PLATELET # BLD AUTO: 220 X10*3/UL (ref 150–450)
PO2 BLDV: 36 MM HG (ref 35–45)
POTASSIUM BLDV-SCNC: 4.1 MMOL/L (ref 3.5–5.3)
POTASSIUM SERPL-SCNC: 3.9 MMOL/L (ref 3.5–5.3)
PR INTERVAL: 146 MS
PROT SERPL-MCNC: 7.3 G/DL (ref 6.4–8.2)
PROTHROMBIN TIME: 13.7 SECONDS (ref 9.8–12.4)
Q ONSET: 220 MS
QRS COUNT: 20 BEATS
QRS DURATION: 74 MS
QT INTERVAL: 308 MS
QTC CALCULATION(BAZETT): 429 MS
QTC FREDERICIA: 385 MS
R AXIS: 27 DEGREES
RBC # BLD AUTO: 4.94 X10*6/UL (ref 4–5.2)
RSV RNA RESP QL NAA+PROBE: NOT DETECTED
SAO2 % BLDV: 63 % (ref 45–75)
SARS-COV-2 RNA RESP QL NAA+PROBE: NOT DETECTED
SODIUM BLDV-SCNC: 136 MMOL/L (ref 136–145)
SODIUM SERPL-SCNC: 135 MMOL/L (ref 136–145)
T AXIS: 35 DEGREES
T OFFSET: 374 MS
VENTRICULAR RATE: 117 BPM
WBC # BLD AUTO: 12 X10*3/UL (ref 4.4–11.3)

## 2025-06-04 PROCEDURE — 2500000004 HC RX 250 GENERAL PHARMACY W/ HCPCS (ALT 636 FOR OP/ED)

## 2025-06-04 PROCEDURE — 84132 ASSAY OF SERUM POTASSIUM: CPT | Performed by: PHYSICIAN ASSISTANT

## 2025-06-04 PROCEDURE — 94640 AIRWAY INHALATION TREATMENT: CPT

## 2025-06-04 PROCEDURE — 85610 PROTHROMBIN TIME: CPT | Performed by: PHYSICIAN ASSISTANT

## 2025-06-04 PROCEDURE — 2500000002 HC RX 250 W HCPCS SELF ADMINISTERED DRUGS (ALT 637 FOR MEDICARE OP, ALT 636 FOR OP/ED)

## 2025-06-04 PROCEDURE — 36415 COLL VENOUS BLD VENIPUNCTURE: CPT | Performed by: PHYSICIAN ASSISTANT

## 2025-06-04 PROCEDURE — 80053 COMPREHEN METABOLIC PANEL: CPT | Performed by: PHYSICIAN ASSISTANT

## 2025-06-04 PROCEDURE — 99223 1ST HOSP IP/OBS HIGH 75: CPT

## 2025-06-04 PROCEDURE — 96367 TX/PROPH/DG ADDL SEQ IV INF: CPT

## 2025-06-04 PROCEDURE — 93010 ELECTROCARDIOGRAM REPORT: CPT | Performed by: INTERNAL MEDICINE

## 2025-06-04 PROCEDURE — 2500000005 HC RX 250 GENERAL PHARMACY W/O HCPCS: Performed by: PHYSICIAN ASSISTANT

## 2025-06-04 PROCEDURE — 87040 BLOOD CULTURE FOR BACTERIA: CPT | Mod: ELYLAB | Performed by: PHYSICIAN ASSISTANT

## 2025-06-04 PROCEDURE — 93005 ELECTROCARDIOGRAM TRACING: CPT

## 2025-06-04 PROCEDURE — 99285 EMERGENCY DEPT VISIT HI MDM: CPT | Performed by: STUDENT IN AN ORGANIZED HEALTH CARE EDUCATION/TRAINING PROGRAM

## 2025-06-04 PROCEDURE — 96374 THER/PROPH/DIAG INJ IV PUSH: CPT | Mod: 59

## 2025-06-04 PROCEDURE — 85025 COMPLETE CBC W/AUTO DIFF WBC: CPT | Performed by: PHYSICIAN ASSISTANT

## 2025-06-04 PROCEDURE — 96365 THER/PROPH/DIAG IV INF INIT: CPT | Mod: 59

## 2025-06-04 PROCEDURE — 85018 HEMOGLOBIN: CPT | Performed by: PHYSICIAN ASSISTANT

## 2025-06-04 PROCEDURE — 84075 ASSAY ALKALINE PHOSPHATASE: CPT | Performed by: PHYSICIAN ASSISTANT

## 2025-06-04 PROCEDURE — 83735 ASSAY OF MAGNESIUM: CPT | Performed by: PHYSICIAN ASSISTANT

## 2025-06-04 PROCEDURE — G0378 HOSPITAL OBSERVATION PER HR: HCPCS

## 2025-06-04 PROCEDURE — 85379 FIBRIN DEGRADATION QUANT: CPT | Performed by: PHYSICIAN ASSISTANT

## 2025-06-04 PROCEDURE — 2500000004 HC RX 250 GENERAL PHARMACY W/ HCPCS (ALT 636 FOR OP/ED): Performed by: PHYSICIAN ASSISTANT

## 2025-06-04 PROCEDURE — 96375 TX/PRO/DX INJ NEW DRUG ADDON: CPT

## 2025-06-04 PROCEDURE — 2500000004 HC RX 250 GENERAL PHARMACY W/ HCPCS (ALT 636 FOR OP/ED): Performed by: STUDENT IN AN ORGANIZED HEALTH CARE EDUCATION/TRAINING PROGRAM

## 2025-06-04 PROCEDURE — 84702 CHORIONIC GONADOTROPIN TEST: CPT | Performed by: PHYSICIAN ASSISTANT

## 2025-06-04 PROCEDURE — 83880 ASSAY OF NATRIURETIC PEPTIDE: CPT | Performed by: PHYSICIAN ASSISTANT

## 2025-06-04 PROCEDURE — 71045 X-RAY EXAM CHEST 1 VIEW: CPT

## 2025-06-04 PROCEDURE — 2500000001 HC RX 250 WO HCPCS SELF ADMINISTERED DRUGS (ALT 637 FOR MEDICARE OP): Performed by: STUDENT IN AN ORGANIZED HEALTH CARE EDUCATION/TRAINING PROGRAM

## 2025-06-04 PROCEDURE — 87075 CULTR BACTERIA EXCEPT BLOOD: CPT | Mod: ELYLAB | Performed by: PHYSICIAN ASSISTANT

## 2025-06-04 PROCEDURE — 84484 ASSAY OF TROPONIN QUANT: CPT | Performed by: PHYSICIAN ASSISTANT

## 2025-06-04 PROCEDURE — 71045 X-RAY EXAM CHEST 1 VIEW: CPT | Performed by: RADIOLOGY

## 2025-06-04 PROCEDURE — 2500000002 HC RX 250 W HCPCS SELF ADMINISTERED DRUGS (ALT 637 FOR MEDICARE OP, ALT 636 FOR OP/ED): Performed by: PHYSICIAN ASSISTANT

## 2025-06-04 PROCEDURE — 87637 SARSCOV2&INF A&B&RSV AMP PRB: CPT | Performed by: PHYSICIAN ASSISTANT

## 2025-06-04 PROCEDURE — 96372 THER/PROPH/DIAG INJ SC/IM: CPT

## 2025-06-04 PROCEDURE — 71045 X-RAY EXAM CHEST 1 VIEW: CPT | Performed by: SURGERY

## 2025-06-04 RX ORDER — SEMAGLUTIDE 1.34 MG/ML
1 INJECTION, SOLUTION SUBCUTANEOUS
COMMUNITY

## 2025-06-04 RX ORDER — HYDROXYZINE HYDROCHLORIDE 25 MG/1
25 TABLET, FILM COATED ORAL ONCE
Status: COMPLETED | OUTPATIENT
Start: 2025-06-05 | End: 2025-06-04

## 2025-06-04 RX ORDER — ACETAMINOPHEN 650 MG/1
650 SUPPOSITORY RECTAL EVERY 4 HOURS PRN
Status: DISCONTINUED | OUTPATIENT
Start: 2025-06-04 | End: 2025-06-07 | Stop reason: HOSPADM

## 2025-06-04 RX ORDER — GABAPENTIN 600 MG/1
600 TABLET ORAL 3 TIMES DAILY PRN
COMMUNITY

## 2025-06-04 RX ORDER — CLONIDINE HYDROCHLORIDE 0.1 MG/1
0.2 TABLET ORAL 3 TIMES DAILY PRN
Status: DISCONTINUED | OUTPATIENT
Start: 2025-06-04 | End: 2025-06-07 | Stop reason: HOSPADM

## 2025-06-04 RX ORDER — CLONIDINE HYDROCHLORIDE 0.1 MG/1
0.2 TABLET ORAL 3 TIMES DAILY PRN
COMMUNITY

## 2025-06-04 RX ORDER — POLYETHYLENE GLYCOL 3350 17 G/17G
17 POWDER, FOR SOLUTION ORAL DAILY
Status: DISCONTINUED | OUTPATIENT
Start: 2025-06-05 | End: 2025-06-06

## 2025-06-04 RX ORDER — PREGABALIN 150 MG/1
CAPSULE ORAL
Qty: 90 CAPSULE | Refills: 2 | Status: SHIPPED | OUTPATIENT
Start: 2025-06-04 | End: 2025-09-01

## 2025-06-04 RX ORDER — CYCLOBENZAPRINE HCL 10 MG
10 TABLET ORAL NIGHTLY PRN
Status: DISCONTINUED | OUTPATIENT
Start: 2025-06-04 | End: 2025-06-07 | Stop reason: HOSPADM

## 2025-06-04 RX ORDER — ENOXAPARIN SODIUM 100 MG/ML
40 INJECTION SUBCUTANEOUS EVERY 24 HOURS
Status: DISCONTINUED | OUTPATIENT
Start: 2025-06-04 | End: 2025-06-07 | Stop reason: HOSPADM

## 2025-06-04 RX ORDER — KETOROLAC TROMETHAMINE 30 MG/ML
30 INJECTION, SOLUTION INTRAMUSCULAR; INTRAVENOUS EVERY 6 HOURS PRN
Status: DISCONTINUED | OUTPATIENT
Start: 2025-06-04 | End: 2025-06-05

## 2025-06-04 RX ORDER — FAMOTIDINE 20 MG/1
20 TABLET, FILM COATED ORAL DAILY
COMMUNITY
Start: 2025-06-04

## 2025-06-04 RX ORDER — KETOROLAC TROMETHAMINE 30 MG/ML
15 INJECTION, SOLUTION INTRAMUSCULAR; INTRAVENOUS ONCE
Status: COMPLETED | OUTPATIENT
Start: 2025-06-04 | End: 2025-06-04

## 2025-06-04 RX ORDER — CYCLOBENZAPRINE HCL 10 MG
10 TABLET ORAL 2 TIMES DAILY
Status: DISCONTINUED | OUTPATIENT
Start: 2025-06-04 | End: 2025-06-04

## 2025-06-04 RX ORDER — CYCLOBENZAPRINE HCL 10 MG
10 TABLET ORAL NIGHTLY PRN
Qty: 90 TABLET | Refills: 2 | Status: SHIPPED | OUTPATIENT
Start: 2025-06-04

## 2025-06-04 RX ORDER — MELOXICAM 7.5 MG/1
7.5 TABLET ORAL 2 TIMES DAILY
COMMUNITY

## 2025-06-04 RX ORDER — POLYETHYLENE GLYCOL 3350 17 G/17G
17 POWDER, FOR SOLUTION ORAL DAILY PRN
COMMUNITY

## 2025-06-04 RX ORDER — PREGABALIN 150 MG/1
150 CAPSULE ORAL 3 TIMES DAILY
COMMUNITY

## 2025-06-04 RX ORDER — ACETAMINOPHEN 325 MG/1
650 TABLET ORAL EVERY 4 HOURS PRN
Status: DISCONTINUED | OUTPATIENT
Start: 2025-06-04 | End: 2025-06-07 | Stop reason: HOSPADM

## 2025-06-04 RX ORDER — ACETAMINOPHEN 160 MG/5ML
650 SOLUTION ORAL EVERY 4 HOURS PRN
Status: DISCONTINUED | OUTPATIENT
Start: 2025-06-04 | End: 2025-06-07 | Stop reason: HOSPADM

## 2025-06-04 RX ORDER — IPRATROPIUM BROMIDE AND ALBUTEROL SULFATE 2.5; .5 MG/3ML; MG/3ML
3 SOLUTION RESPIRATORY (INHALATION) EVERY 20 MIN
Status: COMPLETED | OUTPATIENT
Start: 2025-06-04 | End: 2025-06-04

## 2025-06-04 RX ORDER — ONDANSETRON HYDROCHLORIDE 2 MG/ML
4 INJECTION, SOLUTION INTRAVENOUS EVERY 8 HOURS PRN
Status: DISCONTINUED | OUTPATIENT
Start: 2025-06-04 | End: 2025-06-07 | Stop reason: HOSPADM

## 2025-06-04 RX ORDER — CLONIDINE HYDROCHLORIDE 0.1 MG/1
0.1 TABLET ORAL 3 TIMES DAILY PRN
Status: DISCONTINUED | OUTPATIENT
Start: 2025-06-04 | End: 2025-06-04

## 2025-06-04 RX ORDER — ONDANSETRON 4 MG/1
4 TABLET, FILM COATED ORAL EVERY 8 HOURS PRN
Status: DISCONTINUED | OUTPATIENT
Start: 2025-06-04 | End: 2025-06-07 | Stop reason: HOSPADM

## 2025-06-04 RX ORDER — ALBUTEROL SULFATE 90 UG/1
2 INHALANT RESPIRATORY (INHALATION) 4 TIMES DAILY PRN
COMMUNITY
Start: 2015-08-05

## 2025-06-04 RX ORDER — ACETAMINOPHEN, ASPIRIN (NSAID), AND CAFFEINE 250; 250; 65 MG/1; MG/1; MG/1
2 TABLET, FILM COATED ORAL EVERY 8 HOURS PRN
COMMUNITY
Start: 2015-08-05

## 2025-06-04 RX ORDER — FAMOTIDINE 20 MG/1
20 TABLET, FILM COATED ORAL DAILY
Status: DISCONTINUED | OUTPATIENT
Start: 2025-06-05 | End: 2025-06-07 | Stop reason: HOSPADM

## 2025-06-04 RX ORDER — CEFTRIAXONE 1 G/50ML
1 INJECTION, SOLUTION INTRAVENOUS ONCE
Status: COMPLETED | OUTPATIENT
Start: 2025-06-04 | End: 2025-06-04

## 2025-06-04 RX ORDER — FAMOTIDINE 20 MG/1
20 TABLET, FILM COATED ORAL PRN
Qty: 90 TABLET | Refills: 2 | Status: SHIPPED | OUTPATIENT
Start: 2025-06-04

## 2025-06-04 RX ORDER — CYCLOBENZAPRINE HCL 10 MG
10 TABLET ORAL DAILY PRN
COMMUNITY

## 2025-06-04 RX ORDER — IPRATROPIUM BROMIDE AND ALBUTEROL SULFATE 2.5; .5 MG/3ML; MG/3ML
3 SOLUTION RESPIRATORY (INHALATION)
Status: DISCONTINUED | OUTPATIENT
Start: 2025-06-04 | End: 2025-06-07 | Stop reason: HOSPADM

## 2025-06-04 RX ORDER — SODIUM CHLORIDE 9 MG/ML
100 INJECTION, SOLUTION INTRAVENOUS CONTINUOUS
Status: DISCONTINUED | OUTPATIENT
Start: 2025-06-04 | End: 2025-06-05

## 2025-06-04 RX ORDER — ALBUTEROL SULFATE 90 UG/1
2 INHALANT RESPIRATORY (INHALATION) 4 TIMES DAILY PRN
Status: DISCONTINUED | OUTPATIENT
Start: 2025-06-04 | End: 2025-06-07 | Stop reason: HOSPADM

## 2025-06-04 RX ORDER — PREGABALIN 50 MG/1
150 CAPSULE ORAL 3 TIMES DAILY
Status: DISCONTINUED | OUTPATIENT
Start: 2025-06-04 | End: 2025-06-07 | Stop reason: HOSPADM

## 2025-06-04 RX ADMIN — KETOROLAC TROMETHAMINE 15 MG: 30 INJECTION, SOLUTION INTRAMUSCULAR at 15:31

## 2025-06-04 RX ADMIN — IPRATROPIUM BROMIDE AND ALBUTEROL SULFATE 3 ML: 2.5; .5 SOLUTION RESPIRATORY (INHALATION) at 15:36

## 2025-06-04 RX ADMIN — PREGABALIN 150 MG: 50 CAPSULE ORAL at 22:58

## 2025-06-04 RX ADMIN — Medication 2 L/MIN: at 18:40

## 2025-06-04 RX ADMIN — SODIUM CHLORIDE 1000 ML: 0.9 INJECTION, SOLUTION INTRAVENOUS at 18:37

## 2025-06-04 RX ADMIN — SODIUM CHLORIDE 100 ML/HR: 0.9 INJECTION, SOLUTION INTRAVENOUS at 22:58

## 2025-06-04 RX ADMIN — IPRATROPIUM BROMIDE AND ALBUTEROL SULFATE 3 ML: 2.5; .5 SOLUTION RESPIRATORY (INHALATION) at 15:35

## 2025-06-04 RX ADMIN — METHYLPREDNISOLONE SODIUM SUCCINATE 125 MG: 125 INJECTION, POWDER, FOR SOLUTION INTRAMUSCULAR; INTRAVENOUS at 15:31

## 2025-06-04 RX ADMIN — AZITHROMYCIN MONOHYDRATE 500 MG: 500 INJECTION, POWDER, LYOPHILIZED, FOR SOLUTION INTRAVENOUS at 19:24

## 2025-06-04 RX ADMIN — Medication 2 L/MIN: at 19:31

## 2025-06-04 RX ADMIN — ENOXAPARIN SODIUM 40 MG: 40 INJECTION SUBCUTANEOUS at 22:59

## 2025-06-04 RX ADMIN — CEFTRIAXONE 1 G: 1 INJECTION, SOLUTION INTRAVENOUS at 18:36

## 2025-06-04 RX ADMIN — HYDROXYZINE HYDROCHLORIDE 25 MG: 25 TABLET ORAL at 23:50

## 2025-06-04 RX ADMIN — HYDROMORPHONE HYDROCHLORIDE 0.5 MG: 1 INJECTION, SOLUTION INTRAMUSCULAR; INTRAVENOUS; SUBCUTANEOUS at 19:25

## 2025-06-04 RX ADMIN — IPRATROPIUM BROMIDE AND ALBUTEROL SULFATE 3 ML: 2.5; .5 SOLUTION RESPIRATORY (INHALATION) at 22:26

## 2025-06-04 RX ADMIN — KETOROLAC TROMETHAMINE 30 MG: 30 INJECTION, SOLUTION INTRAMUSCULAR at 23:33

## 2025-06-04 RX ADMIN — IPRATROPIUM BROMIDE AND ALBUTEROL SULFATE 3 ML: 2.5; .5 SOLUTION RESPIRATORY (INHALATION) at 15:32

## 2025-06-04 SDOH — SOCIAL STABILITY: SOCIAL INSECURITY: DOES ANYONE TRY TO KEEP YOU FROM HAVING/CONTACTING OTHER FRIENDS OR DOING THINGS OUTSIDE YOUR HOME?: NO

## 2025-06-04 SDOH — ECONOMIC STABILITY: HOUSING INSECURITY: IN THE LAST 12 MONTHS, WAS THERE A TIME WHEN YOU WERE NOT ABLE TO PAY THE MORTGAGE OR RENT ON TIME?: NO

## 2025-06-04 SDOH — SOCIAL STABILITY: SOCIAL INSECURITY: DO YOU FEEL UNSAFE GOING BACK TO THE PLACE WHERE YOU ARE LIVING?: NO

## 2025-06-04 SDOH — SOCIAL STABILITY: SOCIAL INSECURITY: WITHIN THE LAST YEAR, HAVE YOU BEEN HUMILIATED OR EMOTIONALLY ABUSED IN OTHER WAYS BY YOUR PARTNER OR EX-PARTNER?: NO

## 2025-06-04 SDOH — SOCIAL STABILITY: SOCIAL INSECURITY
WITHIN THE LAST YEAR, HAVE YOU BEEN KICKED, HIT, SLAPPED, OR OTHERWISE PHYSICALLY HURT BY YOUR PARTNER OR EX-PARTNER?: NO

## 2025-06-04 SDOH — ECONOMIC STABILITY: FOOD INSECURITY: HOW HARD IS IT FOR YOU TO PAY FOR THE VERY BASICS LIKE FOOD, HOUSING, MEDICAL CARE, AND HEATING?: NOT VERY HARD

## 2025-06-04 SDOH — SOCIAL STABILITY: SOCIAL INSECURITY
WITHIN THE LAST YEAR, HAVE YOU BEEN RAPED OR FORCED TO HAVE ANY KIND OF SEXUAL ACTIVITY BY YOUR PARTNER OR EX-PARTNER?: NO

## 2025-06-04 SDOH — ECONOMIC STABILITY: HOUSING INSECURITY: AT ANY TIME IN THE PAST 12 MONTHS, WERE YOU HOMELESS OR LIVING IN A SHELTER (INCLUDING NOW)?: NO

## 2025-06-04 SDOH — ECONOMIC STABILITY: HOUSING INSECURITY: IN THE PAST 12 MONTHS, HOW MANY TIMES HAVE YOU MOVED WHERE YOU WERE LIVING?: 0

## 2025-06-04 SDOH — ECONOMIC STABILITY: FOOD INSECURITY
WITHIN THE PAST 12 MONTHS, YOU WORRIED THAT YOUR FOOD WOULD RUN OUT BEFORE YOU GOT THE MONEY TO BUY MORE.: PATIENT DECLINED

## 2025-06-04 SDOH — ECONOMIC STABILITY: FOOD INSECURITY: WITHIN THE PAST 12 MONTHS, THE FOOD YOU BOUGHT JUST DIDN'T LAST AND YOU DIDN'T HAVE MONEY TO GET MORE.: PATIENT DECLINED

## 2025-06-04 SDOH — SOCIAL STABILITY: SOCIAL INSECURITY: ARE THERE ANY APPARENT SIGNS OF INJURIES/BEHAVIORS THAT COULD BE RELATED TO ABUSE/NEGLECT?: NO

## 2025-06-04 SDOH — SOCIAL STABILITY: SOCIAL INSECURITY: WERE YOU ABLE TO COMPLETE ALL THE BEHAVIORAL HEALTH SCREENINGS?: YES

## 2025-06-04 SDOH — ECONOMIC STABILITY: INCOME INSECURITY
IN THE PAST 12 MONTHS HAS THE ELECTRIC, GAS, OIL, OR WATER COMPANY THREATENED TO SHUT OFF SERVICES IN YOUR HOME?: PATIENT DECLINED

## 2025-06-04 SDOH — SOCIAL STABILITY: SOCIAL INSECURITY: DO YOU FEEL ANYONE HAS EXPLOITED OR TAKEN ADVANTAGE OF YOU FINANCIALLY OR OF YOUR PERSONAL PROPERTY?: NO

## 2025-06-04 SDOH — SOCIAL STABILITY: SOCIAL INSECURITY: ARE YOU OR HAVE YOU BEEN THREATENED OR ABUSED PHYSICALLY, EMOTIONALLY, OR SEXUALLY BY ANYONE?: NO

## 2025-06-04 SDOH — SOCIAL STABILITY: SOCIAL INSECURITY: WITHIN THE LAST YEAR, HAVE YOU BEEN AFRAID OF YOUR PARTNER OR EX-PARTNER?: NO

## 2025-06-04 SDOH — ECONOMIC STABILITY: TRANSPORTATION INSECURITY: IN THE PAST 12 MONTHS, HAS LACK OF TRANSPORTATION KEPT YOU FROM MEDICAL APPOINTMENTS OR FROM GETTING MEDICATIONS?: NO

## 2025-06-04 SDOH — SOCIAL STABILITY: SOCIAL INSECURITY: HAS ANYONE EVER THREATENED TO HURT YOUR FAMILY OR YOUR PETS?: NO

## 2025-06-04 SDOH — SOCIAL STABILITY: SOCIAL INSECURITY: HAVE YOU HAD THOUGHTS OF HARMING ANYONE ELSE?: NO

## 2025-06-04 SDOH — SOCIAL STABILITY: SOCIAL INSECURITY: ABUSE: ADULT

## 2025-06-04 SDOH — SOCIAL STABILITY: SOCIAL INSECURITY: HAVE YOU HAD ANY THOUGHTS OF HARMING ANYONE ELSE?: NO

## 2025-06-04 ASSESSMENT — LIFESTYLE VARIABLES
EVER HAD A DRINK FIRST THING IN THE MORNING TO STEADY YOUR NERVES TO GET RID OF A HANGOVER: NO
HOW OFTEN DO YOU HAVE A DRINK CONTAINING ALCOHOL: MONTHLY OR LESS
EVER FELT BAD OR GUILTY ABOUT YOUR DRINKING: NO
AUDIT-C TOTAL SCORE: 1
SKIP TO QUESTIONS 9-10: 1
HOW OFTEN DO YOU HAVE 6 OR MORE DRINKS ON ONE OCCASION: NEVER
AUDIT-C TOTAL SCORE: 1
HAVE PEOPLE ANNOYED YOU BY CRITICIZING YOUR DRINKING: NO
HAVE YOU EVER FELT YOU SHOULD CUT DOWN ON YOUR DRINKING: NO
HOW MANY STANDARD DRINKS CONTAINING ALCOHOL DO YOU HAVE ON A TYPICAL DAY: 1 OR 2
TOTAL SCORE: 0

## 2025-06-04 ASSESSMENT — COLUMBIA-SUICIDE SEVERITY RATING SCALE - C-SSRS
6. HAVE YOU EVER DONE ANYTHING, STARTED TO DO ANYTHING, OR PREPARED TO DO ANYTHING TO END YOUR LIFE?: NO
2. HAVE YOU ACTUALLY HAD ANY THOUGHTS OF KILLING YOURSELF?: NO
1. IN THE PAST MONTH, HAVE YOU WISHED YOU WERE DEAD OR WISHED YOU COULD GO TO SLEEP AND NOT WAKE UP?: NO
6. HAVE YOU EVER DONE ANYTHING, STARTED TO DO ANYTHING, OR PREPARED TO DO ANYTHING TO END YOUR LIFE?: NO
1. IN THE PAST MONTH, HAVE YOU WISHED YOU WERE DEAD OR WISHED YOU COULD GO TO SLEEP AND NOT WAKE UP?: NO
2. HAVE YOU ACTUALLY HAD ANY THOUGHTS OF KILLING YOURSELF?: NO

## 2025-06-04 ASSESSMENT — PAIN DESCRIPTION - FREQUENCY: FREQUENCY: CONSTANT/CONTINUOUS

## 2025-06-04 ASSESSMENT — ACTIVITIES OF DAILY LIVING (ADL)
PATIENT'S MEMORY ADEQUATE TO SAFELY COMPLETE DAILY ACTIVITIES?: YES
LACK_OF_TRANSPORTATION: NO
BATHING: NEEDS ASSISTANCE
LACK_OF_TRANSPORTATION: NO
DRESSING YOURSELF: NEEDS ASSISTANCE
WALKS IN HOME: NEEDS ASSISTANCE
FEEDING YOURSELF: INDEPENDENT
GROOMING: INDEPENDENT
HEARING - LEFT EAR: FUNCTIONAL
JUDGMENT_ADEQUATE_SAFELY_COMPLETE_DAILY_ACTIVITIES: YES
TOILETING: NEEDS ASSISTANCE
ADEQUATE_TO_COMPLETE_ADL: YES
HEARING - RIGHT EAR: FUNCTIONAL

## 2025-06-04 ASSESSMENT — PAIN DESCRIPTION - PAIN TYPE: TYPE: ACUTE PAIN

## 2025-06-04 ASSESSMENT — COGNITIVE AND FUNCTIONAL STATUS - GENERAL
TOILETING: A LITTLE
DRESSING REGULAR LOWER BODY CLOTHING: A LITTLE
WALKING IN HOSPITAL ROOM: A LOT
MOBILITY SCORE: 17
HELP NEEDED FOR BATHING: A LITTLE
MOVING TO AND FROM BED TO CHAIR: A LITTLE
STANDING UP FROM CHAIR USING ARMS: A LITTLE
CLIMB 3 TO 5 STEPS WITH RAILING: TOTAL
PATIENT BASELINE BEDBOUND: NO
DRESSING REGULAR UPPER BODY CLOTHING: A LITTLE
PATIENT BASELINE BEDBOUND: NO
DAILY ACTIVITIY SCORE: 20

## 2025-06-04 ASSESSMENT — PAIN DESCRIPTION - LOCATION: LOCATION: CHEST

## 2025-06-04 ASSESSMENT — PATIENT HEALTH QUESTIONNAIRE - PHQ9
1. LITTLE INTEREST OR PLEASURE IN DOING THINGS: MORE THAN HALF THE DAYS
2. FEELING DOWN, DEPRESSED OR HOPELESS: MORE THAN HALF THE DAYS
SUM OF ALL RESPONSES TO PHQ9 QUESTIONS 1 & 2: 4

## 2025-06-04 ASSESSMENT — PAIN SCALES - GENERAL
PAINLEVEL_OUTOF10: 9
PAINLEVEL_OUTOF10: 2
PAINLEVEL_OUTOF10: 8

## 2025-06-04 ASSESSMENT — PAIN - FUNCTIONAL ASSESSMENT
PAIN_FUNCTIONAL_ASSESSMENT: 0-10
PAIN_FUNCTIONAL_ASSESSMENT: 0-10

## 2025-06-04 NOTE — SIGNIFICANT EVENT
06/04/25 1536   Inhalation Therapy   Breath Sounds Pre-Treatment Right Diminished   Breath Sounds Pre-Treatment Left Diminished   Pre-Treatment Pulse 112   Pre-Treatment Respirations 20   Breath Sounds Post-Treatment Right Diminished   Breath Sounds Post-Treatment Left Diminished   Post-Treatment Pulse 116   Post-Treatment Respirations 20   Delivery Source Oxygen   Position Sitting   Treatment Tolerance Tolerated well   $ Aerosol/MDI Treatment Charge Aerosol/inhalation treatment     Upon assessment, I observed patient on RA, Treatment administered per order

## 2025-06-04 NOTE — ED PROVIDER NOTES
HPI   Chief Complaint   Patient presents with    Chest Pain       This is a 40-year-old female PMH T2DM, migraines, Charcot-Opal-Tooth, fibromyalgia presenting for evaluation of 3-day history of sharp midsternal pleuritic nonradiating nonexertional chest discomfort with associated shortness of breath.  States has been using home albuterol more than usual and is not helping.  She feels like her inspiration is restricted due to her chest pain.  Denies cough, congestion, fevers, chills, palpitations, vomiting, diaphoresis, syncope.      History provided by:  Patient   used: No            Patient History   Medical History[1]  Surgical History[2]  Family History[3]  Social History[4]    Physical Exam   ED Triage Vitals [06/04/25 1450]   Temperature Heart Rate Respirations BP   37.5 °C (99.5 °F) (!) 122 (!) 22 116/84      Pulse Ox Temp Source Heart Rate Source Patient Position   (!) 92 % Temporal Monitor --      BP Location FiO2 (%)     -- --       Physical Exam    General: Vitals noted, appears uncomfortable.  Neck: Trachea midline. No JVD appreciated.  Cardiac: Tachycardic rate regular rhythm.  No murmur.  Pulmonary: Tachypneic.  Diminished aeration bilaterally.  No omar wheezing.  Abdomen: Soft. Nontender  Extremities: No peripheral edema  Skin: No rash  Neuro: No focal neurologic deficits    ED Course & [facility]   ED Course as of 06/04/25 1921 Wed Jun 04, 2025   1718 Repeat EKG interpreted by me: Sinus tachycardia.  Rate 117.  Normal axis.  Nonspecific anterolateral ST abnormality, inferior Q-wave, QTc 429, no STEMI [BW]      ED Course User Index  [BW] Aron Guajardo PA-C         Diagnoses as of 06/04/25 1921   Pneumonitis                 No data recorded                                 Medical Decision Making  DDx: PE, ACS, larisa-/myocarditis, pneumothorax, pneumonia, pulmonary edema, bronchospasm, dysrhythmia    Patient appears uncomfortable.  Tachycardic.  Tachypneic.  Diminished aeration  bilaterally.  92% on room air.  Was placed on 2 L nasal cannula.  For pain 50 mg IV Toradol.  Was given IV Solu-Medrol 125 mg and DuoNeb x 3.  States she does not feel any better.  Has a low-grade leukocytosis but remainder of her laboratory evaluation is reassuring.  Her chest x-ray does reflect moderate bilateral interstitial edema pattern versus pneumonitis.  Patient did report having vomiting a couple days ago which she also was vaping up until then.  Given the x-ray findings a gram of Rocephin and 500 mg IV Zithromax was ordered additionally.  Patient still complained of chest pain was given 0.5 mg IV Dilaudid.  Feel she would benefit from hospitalization for further care given her imaging findings and supplemental oxygen requirement.  Discussed with the hospitalist who accepted.  This visit was staffed with the attending physician Dr. Ramirez.      Disclaimer: This note was dictated using speech recognition software. An attempt at proofreading was made to minimize errors. Minor errors in transcription may be present.    Amount and/or Complexity of Data Reviewed  Labs: ordered.  Radiology: ordered.  ECG/medicine tests: ordered and independent interpretation performed.     Details: EKG interpreted by me: Sinus tachycardia.  Rate 125.  Normal axis.  Inferior Q waves.  QTc 603.  No STEMI.        Procedure  Procedures       [1]   Past Medical History:  Diagnosis Date    Personal history of other diseases of the nervous system and sense organs 08/05/2015    History of migraine    Personal history of other mental and behavioral disorders 08/05/2015    History of bipolar disorder    Personal history of other mental and behavioral disorders 08/05/2015    History of mental disorder    Personal history of other specified conditions     History of diarrhea   [2]   Past Surgical History:  Procedure Laterality Date    COLONOSCOPY  05/16/2016    Complete Colonoscopy    HYSTERECTOMY  08/05/2015    Hysterectomy    LAPAROSCOPY  DIAGNOSTIC / BIOPSY / ASPIRATION / LYSIS  05/16/2016    Laparoscopy (Diagnostic)   [3] No family history on file.  [4]   Social History  Tobacco Use    Smoking status: Every Day     Types: Cigarettes    Smokeless tobacco: Never   Vaping Use    Vaping status: Every Day   Substance Use Topics    Alcohol use: Yes    Drug use: Yes     Types: Marijuana, MDMA (ecstacy)        Aron Guajardo PA-C  06/04/25 1933

## 2025-06-05 ENCOUNTER — APPOINTMENT (OUTPATIENT)
Dept: CARDIOLOGY | Facility: HOSPITAL | Age: 40
End: 2025-06-05
Payer: MEDICARE

## 2025-06-05 ENCOUNTER — APPOINTMENT (OUTPATIENT)
Dept: RADIOLOGY | Facility: HOSPITAL | Age: 40
End: 2025-06-05
Payer: MEDICARE

## 2025-06-05 LAB
ANION GAP SERPL CALC-SCNC: 10 MMOL/L (ref 10–20)
ATRIAL RATE: 125 BPM
BUN SERPL-MCNC: 20 MG/DL (ref 6–23)
CALCIUM SERPL-MCNC: 8.9 MG/DL (ref 8.6–10.3)
CHLORIDE SERPL-SCNC: 109 MMOL/L (ref 98–107)
CO2 SERPL-SCNC: 24 MMOL/L (ref 21–32)
CREAT SERPL-MCNC: 0.59 MG/DL (ref 0.5–1.05)
EGFRCR SERPLBLD CKD-EPI 2021: >90 ML/MIN/1.73M*2
ERYTHROCYTE [DISTWIDTH] IN BLOOD BY AUTOMATED COUNT: 15.2 % (ref 11.5–14.5)
EST. AVERAGE GLUCOSE BLD GHB EST-MCNC: 114 MG/DL
GLUCOSE BLD MANUAL STRIP-MCNC: 145 MG/DL (ref 74–99)
GLUCOSE BLD MANUAL STRIP-MCNC: 154 MG/DL (ref 74–99)
GLUCOSE BLD MANUAL STRIP-MCNC: 194 MG/DL (ref 74–99)
GLUCOSE SERPL-MCNC: 155 MG/DL (ref 74–99)
HBA1C MFR BLD: 5.6 % (ref ?–5.7)
HCT VFR BLD AUTO: 38.7 % (ref 36–46)
HGB BLD-MCNC: 12.5 G/DL (ref 12–16)
MAGNESIUM SERPL-MCNC: 2.05 MG/DL (ref 1.6–2.4)
MCH RBC QN AUTO: 27.4 PG (ref 26–34)
MCHC RBC AUTO-ENTMCNC: 32.3 G/DL (ref 32–36)
MCV RBC AUTO: 85 FL (ref 80–100)
NRBC BLD-RTO: 0 /100 WBCS (ref 0–0)
P AXIS: 3 DEGREES
PLATELET # BLD AUTO: 199 X10*3/UL (ref 150–450)
POTASSIUM SERPL-SCNC: 3.9 MMOL/L (ref 3.5–5.3)
PR INTERVAL: 112 MS
Q ONSET: 220 MS
QRS COUNT: 21 BEATS
QRS DURATION: 76 MS
QT INTERVAL: 418 MS
QTC CALCULATION(BAZETT): 603 MS
QTC FREDERICIA: 533 MS
R AXIS: 18 DEGREES
RBC # BLD AUTO: 4.57 X10*6/UL (ref 4–5.2)
SODIUM SERPL-SCNC: 139 MMOL/L (ref 136–145)
T AXIS: 44 DEGREES
T OFFSET: 429 MS
VENTRICULAR RATE: 125 BPM
WBC # BLD AUTO: 11.9 X10*3/UL (ref 4.4–11.3)

## 2025-06-05 PROCEDURE — 2500000001 HC RX 250 WO HCPCS SELF ADMINISTERED DRUGS (ALT 637 FOR MEDICARE OP)

## 2025-06-05 PROCEDURE — 83036 HEMOGLOBIN GLYCOSYLATED A1C: CPT | Mod: ELYLAB

## 2025-06-05 PROCEDURE — 2500000001 HC RX 250 WO HCPCS SELF ADMINISTERED DRUGS (ALT 637 FOR MEDICARE OP): Performed by: STUDENT IN AN ORGANIZED HEALTH CARE EDUCATION/TRAINING PROGRAM

## 2025-06-05 PROCEDURE — 2500000004 HC RX 250 GENERAL PHARMACY W/ HCPCS (ALT 636 FOR OP/ED): Performed by: STUDENT IN AN ORGANIZED HEALTH CARE EDUCATION/TRAINING PROGRAM

## 2025-06-05 PROCEDURE — 36415 COLL VENOUS BLD VENIPUNCTURE: CPT

## 2025-06-05 PROCEDURE — 85027 COMPLETE CBC AUTOMATED: CPT

## 2025-06-05 PROCEDURE — S4991 NICOTINE PATCH NONLEGEND: HCPCS

## 2025-06-05 PROCEDURE — 83735 ASSAY OF MAGNESIUM: CPT

## 2025-06-05 PROCEDURE — 2550000001 HC RX 255 CONTRASTS

## 2025-06-05 PROCEDURE — 2500000002 HC RX 250 W HCPCS SELF ADMINISTERED DRUGS (ALT 637 FOR MEDICARE OP, ALT 636 FOR OP/ED): Performed by: STUDENT IN AN ORGANIZED HEALTH CARE EDUCATION/TRAINING PROGRAM

## 2025-06-05 PROCEDURE — 93005 ELECTROCARDIOGRAM TRACING: CPT

## 2025-06-05 PROCEDURE — 71260 CT THORAX DX C+: CPT

## 2025-06-05 PROCEDURE — 82947 ASSAY GLUCOSE BLOOD QUANT: CPT

## 2025-06-05 PROCEDURE — 71260 CT THORAX DX C+: CPT | Performed by: RADIOLOGY

## 2025-06-05 PROCEDURE — 80048 BASIC METABOLIC PNL TOTAL CA: CPT

## 2025-06-05 PROCEDURE — 99232 SBSQ HOSP IP/OBS MODERATE 35: CPT

## 2025-06-05 PROCEDURE — 94640 AIRWAY INHALATION TREATMENT: CPT

## 2025-06-05 PROCEDURE — 1210000001 HC SEMI-PRIVATE ROOM DAILY

## 2025-06-05 PROCEDURE — 2500000004 HC RX 250 GENERAL PHARMACY W/ HCPCS (ALT 636 FOR OP/ED)

## 2025-06-05 PROCEDURE — 2500000002 HC RX 250 W HCPCS SELF ADMINISTERED DRUGS (ALT 637 FOR MEDICARE OP, ALT 636 FOR OP/ED)

## 2025-06-05 PROCEDURE — 2500000005 HC RX 250 GENERAL PHARMACY W/O HCPCS: Performed by: PHYSICIAN ASSISTANT

## 2025-06-05 RX ORDER — INSULIN LISPRO 100 [IU]/ML
0-10 INJECTION, SOLUTION INTRAVENOUS; SUBCUTANEOUS
Status: DISCONTINUED | OUTPATIENT
Start: 2025-06-05 | End: 2025-06-07 | Stop reason: HOSPADM

## 2025-06-05 RX ORDER — IBUPROFEN 200 MG
1 TABLET ORAL DAILY
Status: DISCONTINUED | OUTPATIENT
Start: 2025-06-05 | End: 2025-06-07 | Stop reason: HOSPADM

## 2025-06-05 RX ORDER — DEXTROSE 50 % IN WATER (D50W) INTRAVENOUS SYRINGE
12.5
Status: DISCONTINUED | OUTPATIENT
Start: 2025-06-05 | End: 2025-06-07 | Stop reason: HOSPADM

## 2025-06-05 RX ORDER — DEXTROSE 50 % IN WATER (D50W) INTRAVENOUS SYRINGE
25
Status: DISCONTINUED | OUTPATIENT
Start: 2025-06-05 | End: 2025-06-07 | Stop reason: HOSPADM

## 2025-06-05 RX ORDER — HYDROXYZINE HYDROCHLORIDE 25 MG/1
25 TABLET, FILM COATED ORAL EVERY 6 HOURS PRN
Status: DISCONTINUED | OUTPATIENT
Start: 2025-06-05 | End: 2025-06-07 | Stop reason: HOSPADM

## 2025-06-05 RX ORDER — FUROSEMIDE 10 MG/ML
40 INJECTION INTRAMUSCULAR; INTRAVENOUS ONCE
Status: COMPLETED | OUTPATIENT
Start: 2025-06-05 | End: 2025-06-05

## 2025-06-05 RX ORDER — AZITHROMYCIN 250 MG/1
500 TABLET, FILM COATED ORAL EVERY 24 HOURS
Status: COMPLETED | OUTPATIENT
Start: 2025-06-05 | End: 2025-06-06

## 2025-06-05 RX ORDER — TRAMADOL HYDROCHLORIDE 50 MG/1
25 TABLET, FILM COATED ORAL EVERY 6 HOURS PRN
Status: DISCONTINUED | OUTPATIENT
Start: 2025-06-05 | End: 2025-06-07 | Stop reason: HOSPADM

## 2025-06-05 RX ORDER — HYDROXYZINE HYDROCHLORIDE 25 MG/1
25 TABLET, FILM COATED ORAL ONCE
Status: DISCONTINUED | OUTPATIENT
Start: 2025-06-05 | End: 2025-06-05

## 2025-06-05 RX ORDER — CEFTRIAXONE 1 G/50ML
1 INJECTION, SOLUTION INTRAVENOUS EVERY 24 HOURS
Status: DISCONTINUED | OUTPATIENT
Start: 2025-06-05 | End: 2025-06-07 | Stop reason: HOSPADM

## 2025-06-05 RX ADMIN — IPRATROPIUM BROMIDE AND ALBUTEROL SULFATE 3 ML: 2.5; .5 SOLUTION RESPIRATORY (INHALATION) at 07:05

## 2025-06-05 RX ADMIN — AZITHROMYCIN DIHYDRATE 500 MG: 250 TABLET, FILM COATED ORAL at 18:01

## 2025-06-05 RX ADMIN — POLYETHYLENE GLYCOL 3350 17 G: 17 POWDER, FOR SOLUTION ORAL at 08:10

## 2025-06-05 RX ADMIN — IOHEXOL 75 ML: 350 INJECTION, SOLUTION INTRAVENOUS at 11:12

## 2025-06-05 RX ADMIN — IPRATROPIUM BROMIDE AND ALBUTEROL SULFATE 3 ML: 2.5; .5 SOLUTION RESPIRATORY (INHALATION) at 19:16

## 2025-06-05 RX ADMIN — ENOXAPARIN SODIUM 40 MG: 40 INJECTION SUBCUTANEOUS at 23:50

## 2025-06-05 RX ADMIN — INSULIN LISPRO 1 UNITS: 100 INJECTION, SOLUTION INTRAVENOUS; SUBCUTANEOUS at 12:20

## 2025-06-05 RX ADMIN — METHYLPREDNISOLONE SODIUM SUCCINATE 40 MG: 40 INJECTION, POWDER, LYOPHILIZED, FOR SOLUTION INTRAMUSCULAR; INTRAVENOUS at 18:02

## 2025-06-05 RX ADMIN — CEFTRIAXONE 1 G: 1 INJECTION, SOLUTION INTRAVENOUS at 18:01

## 2025-06-05 RX ADMIN — FAMOTIDINE 20 MG: 20 TABLET, FILM COATED ORAL at 08:10

## 2025-06-05 RX ADMIN — PREGABALIN 150 MG: 50 CAPSULE ORAL at 14:04

## 2025-06-05 RX ADMIN — FUROSEMIDE 40 MG: 10 INJECTION, SOLUTION INTRAMUSCULAR; INTRAVENOUS at 08:10

## 2025-06-05 RX ADMIN — PREGABALIN 150 MG: 50 CAPSULE ORAL at 20:19

## 2025-06-05 RX ADMIN — ACETAMINOPHEN 650 MG: 325 TABLET ORAL at 20:19

## 2025-06-05 RX ADMIN — CLONIDINE HYDROCHLORIDE 0.2 MG: 0.1 TABLET ORAL at 14:05

## 2025-06-05 RX ADMIN — METHYLPREDNISOLONE SODIUM SUCCINATE 40 MG: 40 INJECTION, POWDER, LYOPHILIZED, FOR SOLUTION INTRAMUSCULAR; INTRAVENOUS at 09:39

## 2025-06-05 RX ADMIN — Medication 3 L/MIN: at 20:21

## 2025-06-05 RX ADMIN — PREGABALIN 150 MG: 50 CAPSULE ORAL at 08:10

## 2025-06-05 RX ADMIN — NICOTINE 1 PATCH: 14 PATCH, EXTENDED RELEASE TRANSDERMAL at 09:20

## 2025-06-05 RX ADMIN — TRAMADOL HYDROCHLORIDE 25 MG: 50 TABLET, FILM COATED ORAL at 08:23

## 2025-06-05 RX ADMIN — KETOROLAC TROMETHAMINE 30 MG: 30 INJECTION, SOLUTION INTRAMUSCULAR at 06:47

## 2025-06-05 RX ADMIN — IPRATROPIUM BROMIDE AND ALBUTEROL SULFATE 3 ML: 2.5; .5 SOLUTION RESPIRATORY (INHALATION) at 01:38

## 2025-06-05 RX ADMIN — HYDROXYZINE HYDROCHLORIDE 25 MG: 25 TABLET ORAL at 14:05

## 2025-06-05 RX ADMIN — Medication 6 L/MIN: at 08:01

## 2025-06-05 RX ADMIN — TRAMADOL HYDROCHLORIDE 25 MG: 50 TABLET, FILM COATED ORAL at 18:01

## 2025-06-05 RX ADMIN — HYDROXYZINE HYDROCHLORIDE 25 MG: 25 TABLET ORAL at 20:19

## 2025-06-05 RX ADMIN — IPRATROPIUM BROMIDE AND ALBUTEROL SULFATE 3 ML: 2.5; .5 SOLUTION RESPIRATORY (INHALATION) at 13:19

## 2025-06-05 ASSESSMENT — COGNITIVE AND FUNCTIONAL STATUS - GENERAL
HELP NEEDED FOR BATHING: A LITTLE
MOBILITY SCORE: 24
MOBILITY SCORE: 21
CLIMB 3 TO 5 STEPS WITH RAILING: A LOT
DAILY ACTIVITIY SCORE: 23
DAILY ACTIVITIY SCORE: 24
WALKING IN HOSPITAL ROOM: A LITTLE

## 2025-06-05 ASSESSMENT — PAIN DESCRIPTION - DESCRIPTORS
DESCRIPTORS: SHARP;STABBING
DESCRIPTORS: SHARP;STABBING

## 2025-06-05 ASSESSMENT — PAIN - FUNCTIONAL ASSESSMENT
PAIN_FUNCTIONAL_ASSESSMENT: 0-10
PAIN_FUNCTIONAL_ASSESSMENT: 0-10

## 2025-06-05 ASSESSMENT — PAIN DESCRIPTION - ORIENTATION: ORIENTATION: LOWER

## 2025-06-05 ASSESSMENT — PAIN DESCRIPTION - LOCATION: LOCATION: BACK

## 2025-06-05 ASSESSMENT — PAIN SCALES - GENERAL
PAINLEVEL_OUTOF10: 8
PAINLEVEL_OUTOF10: 7
PAINLEVEL_OUTOF10: 4
PAINLEVEL_OUTOF10: 8

## 2025-06-05 NOTE — CARE PLAN
The patient's goals for the shift include      The clinical goals for the shift include Patient to have  pain at a tolerable level and not experience severe shortness of breath.    Over the shift, the patient will continue to progress toward her goals.

## 2025-06-05 NOTE — PROGRESS NOTES
"Daily Progress Note    Parris Pinto is a 40 y.o. female on day 0 of admission presenting with Pneumonitis.    Subjective   Patient states still having SOB and requiring supplemental O2.  Patient with history of high anxiety.  Will continue current treatment.  Reviewed chest x-ray with patient at bedside, educated on the importance to quit vaping and smoking.        Objective     Physical Exam    Physical Exam  Constitutional:       Appearance: Normal appearance.   HENT:      Head: Normocephalic.      Mouth/Throat:      Mouth: Mucous membranes are moist.   Eyes:      Pupils: Pupils are equal, round, and reactive to light.   Cardiovascular:      Rate and Rhythm: Normal rate and regular rhythm.      Heart sounds: Normal heart sounds, S1 normal and S2 normal.   Pulmonary:      Effort: Pulmonary effort is normal.      Breath sounds: Decreased air movement present. Decreased breath sounds present.   Abdominal:      General: Bowel sounds are normal.      Palpations: Abdomen is soft.   Musculoskeletal:         General: Normal range of motion.      Cervical back: Neck supple.   Skin:     General: Skin is warm.   Neurological:      Mental Status: She is alert and oriented to person, place, and time.   Psychiatric:         Mood and Affect: Mood is anxious.         Behavior: Behavior normal.         Last Recorded Vitals  Blood pressure 123/70, pulse 108, temperature 36.4 °C (97.5 °F), resp. rate 16, height 1.6 m (5' 2.99\"), weight 81.9 kg (180 lb 8.9 oz), SpO2 90%.  Intake/Output last 3 Shifts:  I/O last 3 completed shifts:  In: 2040 (24.9 mL/kg) [I.V.:790 (9.6 mL/kg); IV Piggyback:1250]  Out: - (0 mL/kg)   Weight: 81.9 kg     Medications  Scheduled medications  Scheduled Medications[1]  Continuous medications  Continuous Medications[2]  PRN medications  PRN Medications[3]    Labs  CBC:   Results from last 7 days   Lab Units 06/05/25  0624 06/04/25  1531   WBC AUTO x10*3/uL 11.9* 12.0*   RBC AUTO x10*6/uL 4.57 4.94 "   HEMOGLOBIN g/dL 12.5 13.9   HEMATOCRIT % 38.7 41.4   MCV fL 85 84   MCH pg 27.4 28.1   MCHC g/dL 32.3 33.6   RDW % 15.2* 15.2*   PLATELETS AUTO x10*3/uL 199 220     CMP:    Results from last 7 days   Lab Units 06/05/25  0624 06/04/25  1531   SODIUM mmol/L 139 135*   POTASSIUM mmol/L 3.9 3.9   CHLORIDE mmol/L 109* 104   CO2 mmol/L 24 23   BUN mg/dL 20 13   CREATININE mg/dL 0.59 0.53   GLUCOSE mg/dL 155* 94   PROTEIN TOTAL g/dL  --  7.3   CALCIUM mg/dL 8.9 9.3   BILIRUBIN TOTAL mg/dL  --  0.7   ALK PHOS U/L  --  68   AST U/L  --  17   ALT U/L  --  6*     BMP:    Results from last 7 days   Lab Units 06/05/25  0624 06/04/25  1531   SODIUM mmol/L 139 135*   POTASSIUM mmol/L 3.9 3.9   CHLORIDE mmol/L 109* 104   CO2 mmol/L 24 23   BUN mg/dL 20 13   CREATININE mg/dL 0.59 0.53   CALCIUM mg/dL 8.9 9.3   GLUCOSE mg/dL 155* 94     Magnesium:  Results from last 7 days   Lab Units 06/05/25  0624 06/04/25  1531   MAGNESIUM mg/dL 2.05 1.83     Troponin:    Results from last 7 days   Lab Units 06/04/25  1627 06/04/25  1531   TROPHS ng/L 5 5     BNP:   Results from last 7 days   Lab Units 06/04/25  1531   BNP pg/mL 41     Lipid Panel:  Results from last 7 days   Lab Units 06/04/25  1531   INR  1.2*   PROTIME seconds 13.7*        Nutrition             Relevant Results  Results from last 7 days   Lab Units 06/05/25  0624 06/04/25  1531   GLUCOSE mg/dL 155* 94     Lab Results   Component Value Date    HGBA1C 5.8 02/03/2023        Assessment/Plan    Pneumonitis  Chest pain  Tobacco abuse    -Patient switched from smoking cigarettes to vaping   -CXR shows moderate bilateral interstitial edema, may reflect pneumonitis  -Received steroids and breathing treatments in the ER  -Will continue IV antibiotics for now  -Requiring supplemental O2 at 2 L  -Continue to wean supplemental O2  -Solu-Medrol twice daily  -continue inhalers, mucinex  -Smoking cessation with nicotine.  -Vital signs every 8 hours  -CT of chest  -Education to quit  vaping    T2DM  Fibromyalgia  Asthma  Mood disorder  Obesity  -Resume home meds  -Diabetic diet with Accu-Cheks and sliding scale  -BMI 33.66 encourage healthy lifestyle changes      DVTp: Lovenox    PLAN: Home    ALONSO Soriano-CNP    Plan of care was discussed extensively with patient.  Patient verbalized understanding through teach back method.  All question and concerns addressed upon examination.    Of note, this documentation is completed using the Dragon Dictation system (voice recognition software). There may be spelling and/or grammatical errors that were not corrected prior to final submission.             [1] azithromycin, 500 mg, intravenous, q24h  cefTRIAXone, 1 g, intravenous, q24h  enoxaparin, 40 mg, subcutaneous, q24h  famotidine, 20 mg, oral, Daily  ipratropium-albuteroL, 3 mL, nebulization, q6h  methylPREDNISolone sodium succinate (PF), 40 mg, intravenous, q8h  nicotine, 1 patch, transdermal, Daily  oxygen, , inhalation, Continuous - Inhalation  polyethylene glycol, 17 g, oral, Daily  pregabalin, 150 mg, oral, TID    [2]    [3] PRN medications: acetaminophen **OR** acetaminophen **OR** acetaminophen, albuterol, cloNIDine, cyclobenzaprine, hydrOXYzine HCL, ondansetron **OR** ondansetron, traMADol
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
## 2025-06-05 NOTE — H&P
History Of Present Illness  Parris Pinto is a 40 y.o. female with PMH T2DM, fibromyalgia, asthma, mood disorder, and obesity who is presenting with 3-day history of sharp midsternal pleuritic chest discomfort with SOB.  Patient states she quit smoking couple days ago and switched to vaping.  Around same time patient started having nausea with vomiting and chest discomfort with shortness of breath that worsened today.  States she has been using her home inhalers more than usual.  She feels discomfort with inspiration and hard to feel her lungs.  Denies abdominal pain or diarrhea.  Patient states her bowel movement has been harder to pass than her usual.  Labs unremarkable.  CXR shows moderate interstitial edema concerning for pneumonitis.  Patient does smoke marijuana.  She was treated with steroids and breathing treatments with little improvement and requiring supplemental O2.  Patient will be admitted for further evaluation and treatment     Past Medical History  She has a past medical history of Personal history of other diseases of the nervous system and sense organs (08/05/2015), Personal history of other mental and behavioral disorders (08/05/2015), Personal history of other mental and behavioral disorders (08/05/2015), and Personal history of other specified conditions.    Surgical History  She has a past surgical history that includes Hysterectomy (08/05/2015); Colonoscopy (05/16/2016); and Laparoscopy diagnostic / biopsy / aspiration / lysis (05/16/2016).     Social History  She reports that she has been smoking cigarettes. She has never used smokeless tobacco. She reports current alcohol use. She reports current drug use. Drugs: Marijuana and MDMA (ecstacy).    Family History  Family History[1]     Allergies  Patient has no known allergies.    Review of Systems   Review of systems: 10 system were reviewed and were negative except what was mentioned in history of present illness    Physical  Exam  Constitutional:       Appearance: She is obese. She is ill-appearing.   HENT:      Head: Normocephalic.      Mouth/Throat:      Mouth: Mucous membranes are moist.   Eyes:      Pupils: Pupils are equal, round, and reactive to light.   Cardiovascular:      Rate and Rhythm: Normal rate and regular rhythm.      Heart sounds: Normal heart sounds, S1 normal and S2 normal.   Pulmonary:      Effort: Pulmonary effort is normal.      Breath sounds: Examination of the right-upper field reveals decreased breath sounds. Examination of the left-upper field reveals decreased breath sounds. Decreased breath sounds present.   Abdominal:      General: Bowel sounds are normal.      Palpations: Abdomen is soft.   Musculoskeletal:         General: Normal range of motion.      Cervical back: Neck supple.   Skin:     General: Skin is warm.   Neurological:      Mental Status: She is alert and oriented to person, place, and time.      Motor: Weakness present.   Psychiatric:         Mood and Affect: Mood is anxious.         Behavior: Behavior normal.          Last Recorded Vitals  /68 (BP Location: Left arm, Patient Position: Sitting)   Pulse (!) 101   Temp 37.2 °C (99 °F) (Temporal)   Resp 19   Wt 86.2 kg (190 lb)   SpO2 99%     Relevant Results  CBC:   Results from last 7 days   Lab Units 06/04/25  1531   WBC AUTO x10*3/uL 12.0*   RBC AUTO x10*6/uL 4.94   HEMOGLOBIN g/dL 13.9   HEMATOCRIT % 41.4   MCV fL 84   MCH pg 28.1   MCHC g/dL 33.6   RDW % 15.2*   PLATELETS AUTO x10*3/uL 220     CMP:    Results from last 7 days   Lab Units 06/04/25  1531   SODIUM mmol/L 135*   POTASSIUM mmol/L 3.9   CHLORIDE mmol/L 104   CO2 mmol/L 23   BUN mg/dL 13   CREATININE mg/dL 0.53   GLUCOSE mg/dL 94   PROTEIN TOTAL g/dL 7.3   CALCIUM mg/dL 9.3   BILIRUBIN TOTAL mg/dL 0.7   ALK PHOS U/L 68   AST U/L 17   ALT U/L 6*     BMP:    Results from last 7 days   Lab Units 06/04/25  1531   SODIUM mmol/L 135*   POTASSIUM mmol/L 3.9   CHLORIDE mmol/L 104    CO2 mmol/L 23   BUN mg/dL 13   CREATININE mg/dL 0.53   CALCIUM mg/dL 9.3   GLUCOSE mg/dL 94     Magnesium:  Results from last 7 days   Lab Units 06/04/25  1531   MAGNESIUM mg/dL 1.83     Troponin:    Results from last 7 days   Lab Units 06/04/25  1627 06/04/25  1531   TROPHS ng/L 5 5     BNP:   Results from last 7 days   Lab Units 06/04/25  1531   BNP pg/mL 41     Lipid Panel:    Imagining  ECG 12 lead  Sinus tachycardia  Possible Left atrial enlargement  ST & T wave abnormality, consider anterior ischemia  Abnormal ECG  When compared with ECG of 04-JUN-2025 14:47, (unconfirmed)  Nonspecific T wave abnormality, worse in Inferior leads  Nonspecific T wave abnormality, worse in Lateral leads    See ED provider note for full interpretation and clinical correlation  XR chest 1 view  Narrative: Interpreted By:  Kye Olvera,   STUDY:  XR CHEST 1 VIEW;  6/4/2025 3:34 pm      INDICATION:  Signs/Symptoms:sob.      COMPARISON:  None.      ACCESSION NUMBER(S):  [id number]      ORDERING CLINICIAN:  VIOLETA GONZALEZ      FINDINGS:          CARDIOMEDIASTINAL SILHOUETTE:  Cardiomediastinal silhouette is unremarkable in size and  configuration.      LUNGS:  No consolidation, pneumothorax, or significant effusion. Moderate  bilateral interstitial edema pattern.      ABDOMEN:  No remarkable upper abdominal findings.      BONES:  No acute osseous changes.      Impression: 1.  Moderate bilateral interstitial edema pattern. Findings may also  reflect pneumonitis in the appropriate clinical setting.          Signed by: Kye Olvera 6/4/2025 3:42 PM  Dictation workstation:   [id number]       Assessment/Plan      Pneumonitis  Midsternal pleuritic chest discomfort    -Patient switched from smoking cigarettes to vaping this week  -CXR shows moderate bilateral interstitial edema, may reflect pneumonitis  -Received steroids and breathing treatments in the ER  -Will continue IV antibiotics for now  -Requiring supplemental O2 at 2  L  -Continue to wean supplemental O2  -continue inhalers  -Vital signs every 8 hours      T2DM  Fibromyalgia  Asthma  Mood disorder  Obesity  Nicotine abuse/vaping  -Resume home meds  -Diabetic diet with Accu-Cheks and sliding scale  -BMI 33.66 encourage healthy lifestyle changes  -Smoking cessation with nicotine.    DVTp: Lovenox    PLAN: Home    ALONSO Soriano-CNP    Plan of care was discussed extensively with patient.  Patient verbalized understanding through teach back method.  All question and concerns addressed upon examination.    Of note, this documentation is completed using the Dragon Dictation system (voice recognition software). There may be spelling and/or grammatical errors that were not corrected prior to final submission.           [1] No family history on file.

## 2025-06-06 LAB
ANION GAP SERPL CALC-SCNC: 15 MMOL/L (ref 10–20)
ATRIAL RATE: 109 BPM
BUN SERPL-MCNC: 30 MG/DL (ref 6–23)
CALCIUM SERPL-MCNC: 9.4 MG/DL (ref 8.6–10.3)
CHLORIDE SERPL-SCNC: 107 MMOL/L (ref 98–107)
CO2 SERPL-SCNC: 23 MMOL/L (ref 21–32)
CREAT SERPL-MCNC: 0.66 MG/DL (ref 0.5–1.05)
EGFRCR SERPLBLD CKD-EPI 2021: >90 ML/MIN/1.73M*2
ERYTHROCYTE [DISTWIDTH] IN BLOOD BY AUTOMATED COUNT: 15.5 % (ref 11.5–14.5)
GLUCOSE BLD MANUAL STRIP-MCNC: 108 MG/DL (ref 74–99)
GLUCOSE BLD MANUAL STRIP-MCNC: 131 MG/DL (ref 74–99)
GLUCOSE BLD MANUAL STRIP-MCNC: 132 MG/DL (ref 74–99)
GLUCOSE BLD MANUAL STRIP-MCNC: 133 MG/DL (ref 74–99)
GLUCOSE SERPL-MCNC: 129 MG/DL (ref 74–99)
HCT VFR BLD AUTO: 42.4 % (ref 36–46)
HGB BLD-MCNC: 13.7 G/DL (ref 12–16)
HOLD SPECIMEN: NORMAL
MCH RBC QN AUTO: 27.6 PG (ref 26–34)
MCHC RBC AUTO-ENTMCNC: 32.3 G/DL (ref 32–36)
MCV RBC AUTO: 86 FL (ref 80–100)
NRBC BLD-RTO: 0 /100 WBCS (ref 0–0)
P AXIS: 34 DEGREES
P OFFSET: 196 MS
P ONSET: 142 MS
PLATELET # BLD AUTO: 280 X10*3/UL (ref 150–450)
POTASSIUM SERPL-SCNC: 3.7 MMOL/L (ref 3.5–5.3)
PR INTERVAL: 156 MS
Q ONSET: 220 MS
QRS COUNT: 18 BEATS
QRS DURATION: 76 MS
QT INTERVAL: 346 MS
QTC CALCULATION(BAZETT): 465 MS
QTC FREDERICIA: 422 MS
R AXIS: 38 DEGREES
RBC # BLD AUTO: 4.96 X10*6/UL (ref 4–5.2)
SODIUM SERPL-SCNC: 141 MMOL/L (ref 136–145)
T AXIS: 75 DEGREES
T OFFSET: 393 MS
VENTRICULAR RATE: 109 BPM
WBC # BLD AUTO: 19.5 X10*3/UL (ref 4.4–11.3)

## 2025-06-06 PROCEDURE — 2500000002 HC RX 250 W HCPCS SELF ADMINISTERED DRUGS (ALT 637 FOR MEDICARE OP, ALT 636 FOR OP/ED): Performed by: STUDENT IN AN ORGANIZED HEALTH CARE EDUCATION/TRAINING PROGRAM

## 2025-06-06 PROCEDURE — 1210000001 HC SEMI-PRIVATE ROOM DAILY

## 2025-06-06 PROCEDURE — 2500000005 HC RX 250 GENERAL PHARMACY W/O HCPCS: Performed by: PHYSICIAN ASSISTANT

## 2025-06-06 PROCEDURE — 2500000001 HC RX 250 WO HCPCS SELF ADMINISTERED DRUGS (ALT 637 FOR MEDICARE OP): Performed by: STUDENT IN AN ORGANIZED HEALTH CARE EDUCATION/TRAINING PROGRAM

## 2025-06-06 PROCEDURE — 2500000004 HC RX 250 GENERAL PHARMACY W/ HCPCS (ALT 636 FOR OP/ED)

## 2025-06-06 PROCEDURE — 94640 AIRWAY INHALATION TREATMENT: CPT

## 2025-06-06 PROCEDURE — 80048 BASIC METABOLIC PNL TOTAL CA: CPT

## 2025-06-06 PROCEDURE — 82947 ASSAY GLUCOSE BLOOD QUANT: CPT

## 2025-06-06 PROCEDURE — 36415 COLL VENOUS BLD VENIPUNCTURE: CPT

## 2025-06-06 PROCEDURE — 2500000002 HC RX 250 W HCPCS SELF ADMINISTERED DRUGS (ALT 637 FOR MEDICARE OP, ALT 636 FOR OP/ED)

## 2025-06-06 PROCEDURE — 85027 COMPLETE CBC AUTOMATED: CPT

## 2025-06-06 PROCEDURE — S4991 NICOTINE PATCH NONLEGEND: HCPCS

## 2025-06-06 PROCEDURE — 2500000001 HC RX 250 WO HCPCS SELF ADMINISTERED DRUGS (ALT 637 FOR MEDICARE OP)

## 2025-06-06 PROCEDURE — 99232 SBSQ HOSP IP/OBS MODERATE 35: CPT

## 2025-06-06 PROCEDURE — 2500000004 HC RX 250 GENERAL PHARMACY W/ HCPCS (ALT 636 FOR OP/ED): Mod: JZ | Performed by: STUDENT IN AN ORGANIZED HEALTH CARE EDUCATION/TRAINING PROGRAM

## 2025-06-06 PROCEDURE — 99221 1ST HOSP IP/OBS SF/LOW 40: CPT | Performed by: PSYCHIATRY & NEUROLOGY

## 2025-06-06 PROCEDURE — 2500000001 HC RX 250 WO HCPCS SELF ADMINISTERED DRUGS (ALT 637 FOR MEDICARE OP): Performed by: PSYCHIATRY & NEUROLOGY

## 2025-06-06 RX ORDER — SYRING-NEEDL,DISP,INSUL,0.3 ML 29 G X1/2"
296 SYRINGE, EMPTY DISPOSABLE MISCELLANEOUS ONCE
Status: COMPLETED | OUTPATIENT
Start: 2025-06-06 | End: 2025-06-06

## 2025-06-06 RX ORDER — SENNOSIDES 8.6 MG/1
1 TABLET ORAL NIGHTLY
Status: DISCONTINUED | OUTPATIENT
Start: 2025-06-06 | End: 2025-06-07 | Stop reason: HOSPADM

## 2025-06-06 RX ORDER — DOCUSATE SODIUM 100 MG/1
100 CAPSULE, LIQUID FILLED ORAL 2 TIMES DAILY
Status: DISCONTINUED | OUTPATIENT
Start: 2025-06-06 | End: 2025-06-07 | Stop reason: HOSPADM

## 2025-06-06 RX ORDER — BISACODYL 10 MG/1
10 SUPPOSITORY RECTAL DAILY
Status: DISCONTINUED | OUTPATIENT
Start: 2025-06-06 | End: 2025-06-07 | Stop reason: HOSPADM

## 2025-06-06 RX ORDER — LURASIDONE HYDROCHLORIDE 40 MG/1
20 TABLET, FILM COATED ORAL
Status: DISCONTINUED | OUTPATIENT
Start: 2025-06-06 | End: 2025-06-07 | Stop reason: HOSPADM

## 2025-06-06 RX ORDER — POLYETHYLENE GLYCOL 3350 17 G/17G
17 POWDER, FOR SOLUTION ORAL 2 TIMES DAILY
Status: DISCONTINUED | OUTPATIENT
Start: 2025-06-06 | End: 2025-06-07 | Stop reason: HOSPADM

## 2025-06-06 RX ADMIN — DOCUSATE SODIUM 100 MG: 100 CAPSULE, LIQUID FILLED ORAL at 00:51

## 2025-06-06 RX ADMIN — POLYETHYLENE GLYCOL 3350 17 G: 17 POWDER, FOR SOLUTION ORAL at 08:54

## 2025-06-06 RX ADMIN — CYCLOBENZAPRINE HYDROCHLORIDE 10 MG: 10 TABLET, FILM COATED ORAL at 00:19

## 2025-06-06 RX ADMIN — BISACODYL 10 MG: 10 SUPPOSITORY RECTAL at 11:46

## 2025-06-06 RX ADMIN — METHYLPREDNISOLONE SODIUM SUCCINATE 40 MG: 40 INJECTION, POWDER, LYOPHILIZED, FOR SOLUTION INTRAMUSCULAR; INTRAVENOUS at 19:01

## 2025-06-06 RX ADMIN — CEFTRIAXONE 1 G: 1 INJECTION, SOLUTION INTRAVENOUS at 19:01

## 2025-06-06 RX ADMIN — ONDANSETRON 4 MG: 4 TABLET, FILM COATED ORAL at 11:30

## 2025-06-06 RX ADMIN — Medication 21 PERCENT: at 19:35

## 2025-06-06 RX ADMIN — CLONIDINE HYDROCHLORIDE 0.2 MG: 0.1 TABLET ORAL at 20:35

## 2025-06-06 RX ADMIN — IPRATROPIUM BROMIDE AND ALBUTEROL SULFATE 3 ML: 2.5; .5 SOLUTION RESPIRATORY (INHALATION) at 01:37

## 2025-06-06 RX ADMIN — PREGABALIN 150 MG: 50 CAPSULE ORAL at 15:58

## 2025-06-06 RX ADMIN — METHYLPREDNISOLONE SODIUM SUCCINATE 40 MG: 40 INJECTION, POWDER, LYOPHILIZED, FOR SOLUTION INTRAMUSCULAR; INTRAVENOUS at 00:52

## 2025-06-06 RX ADMIN — PREGABALIN 150 MG: 50 CAPSULE ORAL at 20:24

## 2025-06-06 RX ADMIN — SENNOSIDES 8.6 MG: 8.6 TABLET ORAL at 20:24

## 2025-06-06 RX ADMIN — AZITHROMYCIN DIHYDRATE 500 MG: 250 TABLET, FILM COATED ORAL at 19:01

## 2025-06-06 RX ADMIN — MAGNESIUM CITRATE 296 ML: 1.75 LIQUID ORAL at 11:07

## 2025-06-06 RX ADMIN — PREGABALIN 150 MG: 50 CAPSULE ORAL at 08:54

## 2025-06-06 RX ADMIN — TRAMADOL HYDROCHLORIDE 25 MG: 50 TABLET, FILM COATED ORAL at 00:19

## 2025-06-06 RX ADMIN — NICOTINE 1 PATCH: 14 PATCH, EXTENDED RELEASE TRANSDERMAL at 08:54

## 2025-06-06 RX ADMIN — TRAMADOL HYDROCHLORIDE 25 MG: 50 TABLET, FILM COATED ORAL at 05:14

## 2025-06-06 RX ADMIN — CYCLOBENZAPRINE HYDROCHLORIDE 10 MG: 10 TABLET, FILM COATED ORAL at 20:35

## 2025-06-06 RX ADMIN — LURASIDONE HYDROCHLORIDE 20 MG: 40 TABLET, FILM COATED ORAL at 08:54

## 2025-06-06 RX ADMIN — TRAMADOL HYDROCHLORIDE 25 MG: 50 TABLET, FILM COATED ORAL at 23:45

## 2025-06-06 RX ADMIN — DOCUSATE SODIUM 100 MG: 100 CAPSULE, LIQUID FILLED ORAL at 08:54

## 2025-06-06 RX ADMIN — ENOXAPARIN SODIUM 40 MG: 40 INJECTION SUBCUTANEOUS at 22:26

## 2025-06-06 RX ADMIN — IPRATROPIUM BROMIDE AND ALBUTEROL SULFATE 3 ML: 2.5; .5 SOLUTION RESPIRATORY (INHALATION) at 13:01

## 2025-06-06 RX ADMIN — POLYETHYLENE GLYCOL 3350 17 G: 17 POWDER, FOR SOLUTION ORAL at 20:24

## 2025-06-06 RX ADMIN — IPRATROPIUM BROMIDE AND ALBUTEROL SULFATE 3 ML: 2.5; .5 SOLUTION RESPIRATORY (INHALATION) at 07:40

## 2025-06-06 RX ADMIN — Medication 2 L/MIN: at 08:55

## 2025-06-06 RX ADMIN — DOCUSATE SODIUM 100 MG: 100 CAPSULE, LIQUID FILLED ORAL at 20:24

## 2025-06-06 RX ADMIN — FAMOTIDINE 20 MG: 20 TABLET, FILM COATED ORAL at 08:54

## 2025-06-06 RX ADMIN — HYDROXYZINE HYDROCHLORIDE 25 MG: 25 TABLET ORAL at 20:35

## 2025-06-06 RX ADMIN — METHYLPREDNISOLONE SODIUM SUCCINATE 40 MG: 40 INJECTION, POWDER, LYOPHILIZED, FOR SOLUTION INTRAMUSCULAR; INTRAVENOUS at 08:54

## 2025-06-06 RX ADMIN — IPRATROPIUM BROMIDE AND ALBUTEROL SULFATE 3 ML: 2.5; .5 SOLUTION RESPIRATORY (INHALATION) at 19:33

## 2025-06-06 RX ADMIN — TRAMADOL HYDROCHLORIDE 25 MG: 50 TABLET, FILM COATED ORAL at 11:37

## 2025-06-06 ASSESSMENT — COGNITIVE AND FUNCTIONAL STATUS - GENERAL
DAILY ACTIVITIY SCORE: 24
MOBILITY SCORE: 23
CLIMB 3 TO 5 STEPS WITH RAILING: A LITTLE

## 2025-06-06 ASSESSMENT — PAIN DESCRIPTION - LOCATION: LOCATION: ABDOMEN

## 2025-06-06 ASSESSMENT — PAIN DESCRIPTION - ORIENTATION: ORIENTATION: MID;UPPER

## 2025-06-06 ASSESSMENT — PAIN DESCRIPTION - DESCRIPTORS: DESCRIPTORS: STABBING

## 2025-06-06 ASSESSMENT — PAIN - FUNCTIONAL ASSESSMENT: PAIN_FUNCTIONAL_ASSESSMENT: 0-10

## 2025-06-06 ASSESSMENT — PAIN SCALES - GENERAL
PAINLEVEL_OUTOF10: 4
PAINLEVEL_OUTOF10: 8

## 2025-06-06 NOTE — CARE PLAN
The patient's goals for the shift include control her pain    The clinical goals for the shift include Patient will have controlled pain and decreased shortness of breath throughout the shift.    Over the shift, the patient will continue to progress toward her goals.

## 2025-06-06 NOTE — PROGRESS NOTES
06/05/25 1755   Discharge Planning   Living Arrangements Spouse/significant other   Support Systems Spouse/significant other;Children   Assistance Needed Independent in adls, iadls. anxious.  no home o2   Type of Residence Private residence   Home or Post Acute Services None   Expected Discharge Disposition Home   Does the patient need discharge transport arranged? No   Stroke Family Assessment   Stroke Family Assessment Needed No   Intensity of Service   Intensity of Service 0-30 min     Pt plan is home.

## 2025-06-06 NOTE — DOCUMENTATION CLARIFICATION NOTE
"    PATIENT:               KARLY HENRIQUEZ  ACCT #:                  [id number]  MRN:                       [id number]  :                       1985  ADMIT DATE:       2025 2:55 PM  DISCH DATE:  RESPONDING PROVIDER #:        [id number]          PROVIDER RESPONSE TEXT:    Atelectasis without infection    CDI QUERY TEXT:    Clarification      Instruction:    Based on your assessment of the patient and the clinical information, please provide the requested documentation by clicking on the appropriate radio button and enter any additional information if prompted.    Question: Is there a diagnosis indicative of the clinical findings for this patient    When answering this query, please exercise your independent professional judgment. The fact that a question is being asked, does not imply that any particular answer is desired or expected.    The patient's clinical indicators include:  Clinical Information: 41 yo presents with chest pain    Clinical Indicators:  Labs WBC   12.0   11.9   19.5     CT Chest \"Prominent patchy ground-glass infiltrates throughout both lungs  with additional multifocal irregular areas of predominantly dependent  atelectasis greatest toward the lung bases. Findings may reflect  atypical infection. Component of edema may also be present.\"     ED \" ED  3-day history of sharp midsternal pleuritic nonradiating nonexertional chest discomfort with associated shortness of breath.\"  \" Pneumonitis\"  \"Chest x-ray with bilateral interstitial infiltrates concerning for edema versus pneumonitis.\"      Internal medicine \" ill-appearing. Pneumonitis Midsternal pleuritic chest discomfort Will continue IV antibiotics for now\"    Treatment: Monitoring Daily labs, CXR, CT,  O2 2L , ceftriaxone (Rocephin) 1 g in dextrose (iso) IV 50 mL ?q 24hrs,  azithromycin (Zithromax) tablet 500 mg q 24 hrs      Risk Factors: Vaping, Chest pain  Options provided:  -- Pneumonia, Please specify type " below  -- Atelectasis without infection  -- Ground-glass infiltrates without clinical significance for this stay  -- Other - I will add my own diagnosis  -- Refer to Clinical Documentation Reviewer    Query created by: Pepper Hobson on 6/6/2025 11:54 AM      Electronically signed by:  VLAD MELGOZA 6/6/2025 12:25 PM

## 2025-06-06 NOTE — CONSULTS
History Of Present Illness  Parris Pinto is a 40 y.o. female presenting with shortness of breath and getting treatment for pneumonitis was seen by psychiatry for assessment and management of her bipolar depression.     Parris on assessment this morning was quite tearful and tensed.  She had not slept last night . it was not just due to coughing and shortness of breath that she could not sleep but also she has been experiencing a lot of racing thoughts anxiety and depression.  She denied any active suicidal ideation intent or plan but with not being on her psychotropic medications for the past year and relapse earlier in the year she is having a lot of self-doubt p and depressive symptoms.  She was offered inpatient psychiatry unit care for few days to get her medication monitored for side effects and efficacy and she declined that.  On assessment this morning patient denied any auditory hallucinations, visual hallucinations, did not endorse any delusions and no objective signs of psychosis evident. No signs of disorganized behavior, disorganized speech. Patient Has been experiencing racing thoughts, flight of ideas, severe sleeplessness, unusually elevated or angry mood, unusual impulsivity,      Substance abuse: Patient had relapsed on cocaine last used 2 weeks ago she considers her family and kids as strong protective factor against suicide          MSE: Patient was alert, oriented to time, place, person and situation. Patient appears well groomed and clad in climate appropriate clothes. Patient is tearful on approach. Recent and remote memory within normal limits. Memory registration and recall within normal limits. Attention and concentration within normal limits. Speech normal in rate, rhythm and volume. Good eye contact. Thought process Linear. Intact associations. Good fund of knowledge. Mood sad and affect labile. Patient did not endorse any delusions. Patient denied any auditory visual hallucinations.  "Patient has denied any active suicidal  ideations and is  future oriented.  Patient has fair insight, fair judgment and good impulse control.     Musculoskeletal: Normal gait, no Parkinsonism, no Dystonia, no Akathisia, no TD. Psychomotor activity within normal limits.     Diagnosis: bipolar 1 depressed    Assessment and Plan:     although patient is not at imminent risk of harm to self or others Patient was offered inpatient psychiatry unit stay to get her started on treatment and monitoring of side effect and efficacy with patient declined  Latuda 20 mg discussed benefits risk and side effects and weight neutrality discussed and she is willing to try it.  It would be offered to her in the daytime but if it makes her sleepy it can be switched to evening  Patient would need outpatient psychiatry therapy and follow-up    .     Past Medical History  Medical History[1]    Surgical History  Surgical History[2]     Social History  She reports that she has been smoking cigarettes. She has never used smokeless tobacco. She reports current alcohol use. She reports current drug use. Drugs: Marijuana and MDMA (ecstacy).    Family History  Family History[3]     Allergies  Patient has no known allergies.     Last Recorded Vitals  Blood pressure (!) 131/100, pulse 102, temperature (!) 38 °C (100.4 °F), temperature source Temporal, resp. rate 18, height 1.6 m (5' 2.99\"), weight 81.9 kg (180 lb 8.9 oz), SpO2 95%.    Relevant Results  Recent Results (from the past 48 hours)   CBC and Auto Differential    Collection Time: 06/04/25  3:31 PM   Result Value Ref Range    WBC 12.0 (H) 4.4 - 11.3 x10*3/uL    nRBC 0.0 0.0 - 0.0 /100 WBCs    RBC 4.94 4.00 - 5.20 x10*6/uL    Hemoglobin 13.9 12.0 - 16.0 g/dL    Hematocrit 41.4 36.0 - 46.0 %    MCV 84 80 - 100 fL    MCH 28.1 26.0 - 34.0 pg    MCHC 33.6 32.0 - 36.0 g/dL    RDW 15.2 (H) 11.5 - 14.5 %    Platelets 220 150 - 450 x10*3/uL    Neutrophils % 78.3 40.0 - 80.0 %    Immature Granulocytes " %, Automated 0.3 0.0 - 0.9 %    Lymphocytes % 15.4 13.0 - 44.0 %    Monocytes % 5.4 2.0 - 10.0 %    Eosinophils % 0.2 0.0 - 6.0 %    Basophils % 0.4 0.0 - 2.0 %    Neutrophils Absolute 9.42 (H) 1.20 - 7.70 x10*3/uL    Immature Granulocytes Absolute, Automated 0.04 0.00 - 0.70 x10*3/uL    Lymphocytes Absolute 1.86 1.20 - 4.80 x10*3/uL    Monocytes Absolute 0.65 0.10 - 1.00 x10*3/uL    Eosinophils Absolute 0.02 0.00 - 0.70 x10*3/uL    Basophils Absolute 0.05 0.00 - 0.10 x10*3/uL   Comprehensive Metabolic Panel    Collection Time: 06/04/25  3:31 PM   Result Value Ref Range    Glucose 94 74 - 99 mg/dL    Sodium 135 (L) 136 - 145 mmol/L    Potassium 3.9 3.5 - 5.3 mmol/L    Chloride 104 98 - 107 mmol/L    Bicarbonate 23 21 - 32 mmol/L    Anion Gap 12 10 - 20 mmol/L    Urea Nitrogen 13 6 - 23 mg/dL    Creatinine 0.53 0.50 - 1.05 mg/dL    eGFR >90 >60 mL/min/1.73m*2    Calcium 9.3 8.6 - 10.3 mg/dL    Albumin 4.4 3.4 - 5.0 g/dL    Alkaline Phosphatase 68 33 - 110 U/L    Total Protein 7.3 6.4 - 8.2 g/dL    AST 17 9 - 39 U/L    Bilirubin, Total 0.7 0.0 - 1.2 mg/dL    ALT 6 (L) 7 - 45 U/L   Magnesium    Collection Time: 06/04/25  3:31 PM   Result Value Ref Range    Magnesium 1.83 1.60 - 2.40 mg/dL   Human Chorionic Gonadotropin, Serum Quantitative    Collection Time: 06/04/25  3:31 PM   Result Value Ref Range    HCG, Beta-Quantitative <2 <5 mIU/mL   D-Dimer, VTE Exclusion    Collection Time: 06/04/25  3:31 PM   Result Value Ref Range    D-Dimer, Quantitative VTE Exclusion 311 <=500 ng/mL FEU   B-type natriuretic peptide    Collection Time: 06/04/25  3:31 PM   Result Value Ref Range    BNP 41 0 - 99 pg/mL   Protime-INR    Collection Time: 06/04/25  3:31 PM   Result Value Ref Range    Protime 13.7 (H) 9.8 - 12.4 seconds    INR 1.2 (H) 0.9 - 1.1   Troponin I, High Sensitivity, Initial    Collection Time: 06/04/25  3:31 PM   Result Value Ref Range    Troponin I, High Sensitivity 5 0 - 13 ng/L   Sars-CoV-2, Influenza A/B and RSV PCR     Collection Time: 06/04/25  3:32 PM   Result Value Ref Range    Coronavirus 2019, PCR Not Detected Not Detected    Flu A Result Not Detected Not Detected    Flu B Result Not Detected Not Detected    RSV PCR Not Detected Not Detected   ECG 12 lead    Collection Time: 06/04/25  4:10 PM   Result Value Ref Range    Ventricular Rate 109 BPM    Atrial Rate 109 BPM    DC Interval 156 ms    QRS Duration 76 ms    QT Interval 346 ms    QTC Calculation(Bazett) 465 ms    P Axis 34 degrees    R Axis 38 degrees    T Axis 75 degrees    QRS Count 18 beats    Q Onset 220 ms    P Onset 142 ms    P Offset 196 ms    T Offset 393 ms    QTC Fredericia 422 ms   ECG 12 lead    Collection Time: 06/04/25  4:16 PM   Result Value Ref Range    Ventricular Rate 117 BPM    Atrial Rate 117 BPM    DC Interval 146 ms    QRS Duration 74 ms    QT Interval 308 ms    QTC Calculation(Bazett) 429 ms    P Axis 36 degrees    R Axis 27 degrees    T Axis 35 degrees    QRS Count 20 beats    Q Onset 220 ms    P Onset 147 ms    P Offset 196 ms    T Offset 374 ms    QTC Fredericia 385 ms   Troponin, High Sensitivity, 1 Hour    Collection Time: 06/04/25  4:27 PM   Result Value Ref Range    Troponin I, High Sensitivity 5 0 - 13 ng/L   Blood Gas Venous Full Panel    Collection Time: 06/04/25  4:27 PM   Result Value Ref Range    POCT pH, Venous 7.37 7.33 - 7.43 pH    POCT pCO2, Venous 48 41 - 51 mm Hg    POCT pO2, Venous 36 35 - 45 mm Hg    POCT SO2, Venous 63 45 - 75 %    POCT Oxy Hemoglobin, Venous 61.0 45.0 - 75.0 %    POCT Hematocrit Calculated, Venous 44.0 36.0 - 46.0 %    POCT Sodium, Venous 136 136 - 145 mmol/L    POCT Potassium, Venous 4.1 3.5 - 5.3 mmol/L    POCT Chloride, Venous 101 98 - 107 mmol/L    POCT Ionized Calicum, Venous 1.21 1.10 - 1.33 mmol/L    POCT Glucose, Venous 99 74 - 99 mg/dL    POCT Lactate, Venous 1.6 0.4 - 2.0 mmol/L    POCT Base Excess, Venous 1.6 -2.0 - 3.0 mmol/L    POCT HCO3 Calculated, Venous 27.7 (H) 22.0 - 26.0 mmol/L    POCT  Hemoglobin, Venous 14.7 12.0 - 16.0 g/dL    POCT Anion Gap, Venous 11.0 10.0 - 25.0 mmol/L    Patient Temperature      FiO2 27 %   Blood Culture    Collection Time: 06/04/25  6:01 PM    Specimen: Peripheral Venipuncture; Blood culture   Result Value Ref Range    Blood Culture No growth at 1 day    Blood Culture    Collection Time: 06/04/25  6:10 PM    Specimen: Peripheral Venipuncture; Blood culture   Result Value Ref Range    Blood Culture No growth at 1 day    Basic Metabolic Panel    Collection Time: 06/05/25  6:24 AM   Result Value Ref Range    Glucose 155 (H) 74 - 99 mg/dL    Sodium 139 136 - 145 mmol/L    Potassium 3.9 3.5 - 5.3 mmol/L    Chloride 109 (H) 98 - 107 mmol/L    Bicarbonate 24 21 - 32 mmol/L    Anion Gap 10 10 - 20 mmol/L    Urea Nitrogen 20 6 - 23 mg/dL    Creatinine 0.59 0.50 - 1.05 mg/dL    eGFR >90 >60 mL/min/1.73m*2    Calcium 8.9 8.6 - 10.3 mg/dL   CBC    Collection Time: 06/05/25  6:24 AM   Result Value Ref Range    WBC 11.9 (H) 4.4 - 11.3 x10*3/uL    nRBC 0.0 0.0 - 0.0 /100 WBCs    RBC 4.57 4.00 - 5.20 x10*6/uL    Hemoglobin 12.5 12.0 - 16.0 g/dL    Hematocrit 38.7 36.0 - 46.0 %    MCV 85 80 - 100 fL    MCH 27.4 26.0 - 34.0 pg    MCHC 32.3 32.0 - 36.0 g/dL    RDW 15.2 (H) 11.5 - 14.5 %    Platelets 199 150 - 450 x10*3/uL   Magnesium    Collection Time: 06/05/25  6:24 AM   Result Value Ref Range    Magnesium 2.05 1.60 - 2.40 mg/dL   Hemoglobin A1C    Collection Time: 06/05/25  6:24 AM   Result Value Ref Range    Hemoglobin A1C 5.6 See comment %    Estimated Average Glucose 114 Not Established mg/dL   POCT GLUCOSE    Collection Time: 06/05/25 11:43 AM   Result Value Ref Range    POCT Glucose 154 (H) 74 - 99 mg/dL   ECG 12 lead    Collection Time: 06/05/25  3:15 PM   Result Value Ref Range    Ventricular Rate 125 BPM    Atrial Rate 125 BPM    OR Interval 112 ms    QRS Duration 76 ms    QT Interval 418 ms    QTC Calculation(Bazett) 603 ms    P Axis 3 degrees    R Axis 18 degrees    T Axis 44  degrees    QRS Count 21 beats    Q Onset 220 ms    T Offset 429 ms    QTC Fredericia 533 ms   POCT GLUCOSE    Collection Time: 06/05/25  4:14 PM   Result Value Ref Range    POCT Glucose 145 (H) 74 - 99 mg/dL   POCT GLUCOSE    Collection Time: 06/05/25  8:24 PM   Result Value Ref Range    POCT Glucose 194 (H) 74 - 99 mg/dL   POCT GLUCOSE    Collection Time: 06/06/25  6:16 AM   Result Value Ref Range    POCT Glucose 133 (H) 74 - 99 mg/dL      Current Medications[4]     Psychiatric Risk Assessment  Violence Risk Assessment: none  Acute Risk of Harm to Others is Considered: low   Suicide Risk Assessment: substance abuse  Protective Factors against Suicide: hopefulness/future orientation, moral objections to suicide, positive family relationships, sense of responsibility toward family, and social support/connectedness  Acute Risk of Harm to Self is Considered: low    Medication Consent: risks, benefits, side effects reviewed for all ordered meds    Mathew Sánchez MDInpatient consult to Psychiatry  Consult performed by: Mathew Sánchez MD  Consult ordered by: RHONA Soriano             [1]   Past Medical History:  Diagnosis Date    Arthritis     Asthma     CMT (Charcot-Opal-Tooth disease)     COPD (chronic obstructive pulmonary disease) (Multi)     Depression     Diabetes mellitus (Multi)     DJD (degenerative joint disease)     Drug abuse and dependence     Fibromyalgia     Personal history of other diseases of the nervous system and sense organs 08/05/2015    History of migraine    Personal history of other mental and behavioral disorders 08/05/2015    History of bipolar disorder    Personal history of other mental and behavioral disorders 08/05/2015    History of mental disorder    Personal history of other specified conditions     History of diarrhea    PTSD (post-traumatic stress disorder)    [2]   Past Surgical History:  Procedure Laterality Date    COLONOSCOPY  05/16/2016    Complete Colonoscopy     HYSTERECTOMY  08/05/2015    Hysterectomy    LAPAROSCOPY DIAGNOSTIC / BIOPSY / ASPIRATION / LYSIS  05/16/2016    Laparoscopy (Diagnostic)   [3] No family history on file.  [4]   Current Facility-Administered Medications:     acetaminophen (Tylenol) tablet 650 mg, 650 mg, oral, q4h PRN, 650 mg at 06/05/25 2019 **OR** acetaminophen (Tylenol) oral liquid 650 mg, 650 mg, oral, q4h PRN **OR** acetaminophen (Tylenol) suppository 650 mg, 650 mg, rectal, q4h PRN, RHONA Soriano    albuterol 90 mcg/actuation inhaler 2 puff, 2 puff, inhalation, 4x daily PRN, Danny Momin MD    azithromycin (Zithromax) tablet 500 mg, 500 mg, oral, q24h, Jesús Torres MD, 500 mg at 06/05/25 1801    cefTRIAXone (Rocephin) 1 g in dextrose (iso) IV 50 mL, 1 g, intravenous, q24h, Jesús Torres MD, Stopped at 06/05/25 1852    cloNIDine (Catapres) tablet 0.2 mg, 0.2 mg, oral, TID PRN, Danny Momin MD, 0.2 mg at 06/05/25 1405    cyclobenzaprine (Flexeril) tablet 10 mg, 10 mg, oral, Nightly PRN, Danny Momin MD, 10 mg at 06/06/25 0019    dextrose 50 % injection 12.5 g, 12.5 g, intravenous, q15 min PRN, RHONA Soriano    dextrose 50 % injection 25 g, 25 g, intravenous, q15 min PRN, RHONA Soriano    docusate sodium (Colace) capsule 100 mg, 100 mg, oral, BID, Danny Momin MD, 100 mg at 06/06/25 0051    enoxaparin (Lovenox) syringe 40 mg, 40 mg, subcutaneous, q24h, RHONA Soriano, 40 mg at 06/05/25 2350    famotidine (Pepcid) tablet 20 mg, 20 mg, oral, Daily, Danny Momin MD, 20 mg at 06/05/25 0810    glucagon (Glucagen) injection 1 mg, 1 mg, intramuscular, q15 min PRN, RHONA Soriano    glucagon (Glucagen) injection 1 mg, 1 mg, intramuscular, q15 min PRN, RHONA Soriano    hydrOXYzine HCL (Atarax) tablet 25 mg, 25 mg, oral, q6h PRN, RHONA Soriano, 25 mg at 06/05/25 2019    insulin lispro injection 0-10 Units, 0-10 Units,  subcutaneous, TID AC, RHONA Soriano, 1 Units at 06/05/25 1220    ipratropium-albuteroL (Duo-Neb) 0.5-2.5 mg/3 mL nebulizer solution 3 mL, 3 mL, nebulization, q6h, RHONA Soriano, 3 mL at 06/06/25 0740    lurasidone (Latuda) tablet 20 mg, 20 mg, oral, Daily with breakfast, Mathew Sánchez MD    methylPREDNISolone sod succinate (SOLU-Medrol) 40 mg/mL injection 40 mg, 40 mg, intravenous, q8h, Jesús Torres MD, 40 mg at 06/06/25 0052    nicotine (Nicoderm CQ) 14 mg/24 hr patch 1 patch, 1 patch, transdermal, Daily, RHONA Soriano, 1 patch at 06/05/25 0920    ondansetron (Zofran) tablet 4 mg, 4 mg, oral, q8h PRN **OR** ondansetron (Zofran) injection 4 mg, 4 mg, intravenous, q8h PRN, RHONA Soriano    oxygen (O2) therapy, , inhalation, Continuous - Inhalation, Aron Guajardo PA-C, 2 L/min at 06/06/25 0746    polyethylene glycol (Glycolax, Miralax) packet 17 g, 17 g, oral, Daily, RHONA Soriano, 17 g at 06/05/25 0810    pregabalin (Lyrica) capsule 150 mg, 150 mg, oral, TID, Danny Momin MD, 150 mg at 06/05/25 2019    traMADol (Ultram) tablet 25 mg, 25 mg, oral, q6h PRN, RHONA Soriano, 25 mg at 06/06/25 0514

## 2025-06-06 NOTE — CARE PLAN
The patient's goals for the shift include control her pain    The clinical goals for the shift include maintain patient safety and comfort and adequate oxygenation

## 2025-06-06 NOTE — PROGRESS NOTES
"Daily Progress Note    Parris Pinto is a 40 y.o. female on day 1 of admission presenting with Pneumonitis.    Subjective   Patient seen sitting up on side of the bed feeling much better.  Patient remains on 2 L we will continue to wean.  Patient CT discussed with patient and encouraged the importance of quitting vaping.  Appreciate psych recommendations.       Objective     Physical Exam    Physical Exam  Constitutional:       Appearance: Normal appearance.   HENT:      Head: Normocephalic.      Mouth/Throat:      Mouth: Mucous membranes are moist.   Eyes:      Pupils: Pupils are equal, round, and reactive to light.   Cardiovascular:      Rate and Rhythm: Normal rate and regular rhythm.      Heart sounds: Normal heart sounds, S1 normal and S2 normal.   Pulmonary:      Effort: Pulmonary effort is normal.      Breath sounds: Decreased breath sounds present.   Abdominal:      General: Bowel sounds are normal.      Palpations: Abdomen is soft.   Musculoskeletal:         General: Normal range of motion.      Cervical back: Neck supple.   Skin:     General: Skin is warm.   Neurological:      Mental Status: She is alert and oriented to person, place, and time.      Motor: Weakness present.   Psychiatric:         Mood and Affect: Mood normal.         Behavior: Behavior normal.         Last Recorded Vitals  Blood pressure (!) 145/102, pulse 101, temperature 37.3 °C (99.1 °F), resp. rate 18, height 1.6 m (5' 2.99\"), weight 81.9 kg (180 lb 8.9 oz), SpO2 93%.  Intake/Output last 3 Shifts:  I/O last 3 completed shifts:  In: 2850 (34.8 mL/kg) [P.O.:600; I.V.:950 (11.6 mL/kg); IV Piggyback:1300]  Out: 1000 (12.2 mL/kg) [Urine:1000 (0.3 mL/kg/hr)]  Weight: 81.9 kg     Medications  Scheduled medications  Scheduled Medications[1]  Continuous medications  Continuous Medications[2]  PRN medications  PRN Medications[3]    Labs  CBC:   Results from last 7 days   Lab Units 06/06/25  0850 06/05/25  0624 06/04/25  1531   WBC AUTO " x10*3/uL 19.5* 11.9* 12.0*   RBC AUTO x10*6/uL 4.96 4.57 4.94   HEMOGLOBIN g/dL 13.7 12.5 13.9   HEMATOCRIT % 42.4 38.7 41.4   MCV fL 86 85 84   MCH pg 27.6 27.4 28.1   MCHC g/dL 32.3 32.3 33.6   RDW % 15.5* 15.2* 15.2*   PLATELETS AUTO x10*3/uL 280 199 220     CMP:    Results from last 7 days   Lab Units 06/06/25  0850 06/05/25 0624 06/04/25  1531   SODIUM mmol/L 141 139 135*   POTASSIUM mmol/L 3.7 3.9 3.9   CHLORIDE mmol/L 107 109* 104   CO2 mmol/L 23 24 23   BUN mg/dL 30* 20 13   CREATININE mg/dL 0.66 0.59 0.53   GLUCOSE mg/dL 129* 155* 94   PROTEIN TOTAL g/dL  --   --  7.3   CALCIUM mg/dL 9.4 8.9 9.3   BILIRUBIN TOTAL mg/dL  --   --  0.7   ALK PHOS U/L  --   --  68   AST U/L  --   --  17   ALT U/L  --   --  6*     BMP:    Results from last 7 days   Lab Units 06/06/25  0850 06/05/25 0624 06/04/25  1531   SODIUM mmol/L 141 139 135*   POTASSIUM mmol/L 3.7 3.9 3.9   CHLORIDE mmol/L 107 109* 104   CO2 mmol/L 23 24 23   BUN mg/dL 30* 20 13   CREATININE mg/dL 0.66 0.59 0.53   CALCIUM mg/dL 9.4 8.9 9.3   GLUCOSE mg/dL 129* 155* 94     Magnesium:  Results from last 7 days   Lab Units 06/05/25  0624 06/04/25  1531   MAGNESIUM mg/dL 2.05 1.83     Troponin:    Results from last 7 days   Lab Units 06/04/25  1627 06/04/25  1531   TROPHS ng/L 5 5     BNP:   Results from last 7 days   Lab Units 06/04/25  1531   BNP pg/mL 41     Lipid Panel:  Results from last 7 days   Lab Units 06/04/25  1531   INR  1.2*   PROTIME seconds 13.7*        Nutrition             Relevant Results  Results from last 7 days   Lab Units 06/06/25  0850 06/06/25  0616 06/05/25 2024 06/05/25  1614 06/05/25  1143 06/05/25  0624 06/04/25  1531   POCT GLUCOSE mg/dL  --  133* 194* 145* 154*  --   --    GLUCOSE mg/dL 129*  --   --   --   --  155* 94     Lab Results   Component Value Date    HGBA1C 5.6 06/05/2025        Assessment/Plan    Pneumonitis  Chest pain  Depression, bipolar and PTSD    -CXR shows moderate bilateral interstitial edema, may reflect  pneumonitis  -Will continue IV antibiotics for now  -Continue to wean supplemental O2  -Solu-Medrol twice daily, inhalers  -Education to quit vaping  -CT chest prominent patchy groundglass infiltrates  -Patient seen by  recommends  -Psych consult recommended inpatient psych for treatment and monitoring patient declined.  Patient will be started on Latuda 20 mg and follow-up outpatient      T2DM  Fibromyalgia  Asthma  Mood disorder  Obesity  -Resume home meds  -Diabetic diet with Accu-Cheks and sliding scale  -BMI 33.66 encourage healthy lifestyle changes    DVTp: Lovenox     PLAN: Anticipate discharge tomorrow    Jaz Lamb, ALONSO-CNP    Plan of care was discussed extensively with patient.  Patient verbalized understanding through teach back method.  All question and concerns addressed upon examination.    Of note, this documentation is completed using the Dragon Dictation system (voice recognition software). There may be spelling and/or grammatical errors that were not corrected prior to final submission.             [1] azithromycin, 500 mg, oral, q24h  cefTRIAXone, 1 g, intravenous, q24h  docusate sodium, 100 mg, oral, BID  enoxaparin, 40 mg, subcutaneous, q24h  famotidine, 20 mg, oral, Daily  insulin lispro, 0-10 Units, subcutaneous, TID AC  ipratropium-albuteroL, 3 mL, nebulization, q6h  lurasidone, 20 mg, oral, Daily with breakfast  magnesium citrate, 296 mL, oral, Once  methylPREDNISolone sodium succinate (PF), 40 mg, intravenous, q8h  nicotine, 1 patch, transdermal, Daily  oxygen, , inhalation, Continuous - Inhalation  polyethylene glycol, 17 g, oral, Daily  pregabalin, 150 mg, oral, TID    [2]    [3] PRN medications: acetaminophen **OR** acetaminophen **OR** acetaminophen, albuterol, cloNIDine, cyclobenzaprine, dextrose, dextrose, glucagon, glucagon, hydrOXYzine HCL, ondansetron **OR** ondansetron, traMADol

## 2025-06-07 ENCOUNTER — DOCUMENTATION (OUTPATIENT)
Dept: CRITICAL CARE MEDICINE | Facility: HOSPITAL | Age: 40
End: 2025-06-07
Payer: MEDICARE

## 2025-06-07 VITALS
DIASTOLIC BLOOD PRESSURE: 75 MMHG | OXYGEN SATURATION: 90 % | HEART RATE: 85 BPM | TEMPERATURE: 97.9 F | SYSTOLIC BLOOD PRESSURE: 131 MMHG | WEIGHT: 180.56 LBS | RESPIRATION RATE: 18 BRPM | BODY MASS INDEX: 31.99 KG/M2 | HEIGHT: 63 IN

## 2025-06-07 PROCEDURE — 99239 HOSP IP/OBS DSCHRG MGMT >30: CPT

## 2025-06-07 PROCEDURE — 94640 AIRWAY INHALATION TREATMENT: CPT

## 2025-06-07 PROCEDURE — 2500000002 HC RX 250 W HCPCS SELF ADMINISTERED DRUGS (ALT 637 FOR MEDICARE OP, ALT 636 FOR OP/ED)

## 2025-06-07 RX ORDER — LURASIDONE HYDROCHLORIDE 20 MG/1
20 TABLET, FILM COATED ORAL
Qty: 30 TABLET | Refills: 0 | Status: SHIPPED | OUTPATIENT
Start: 2025-06-07 | End: 2025-07-07

## 2025-06-07 RX ORDER — AZITHROMYCIN 250 MG/1
TABLET, FILM COATED ORAL
Qty: 6 TABLET | Refills: 0 | Status: SHIPPED | OUTPATIENT
Start: 2025-06-07 | End: 2025-06-12

## 2025-06-07 RX ADMIN — IPRATROPIUM BROMIDE AND ALBUTEROL SULFATE 3 ML: 2.5; .5 SOLUTION RESPIRATORY (INHALATION) at 00:20

## 2025-06-07 NOTE — NURSING NOTE
00:55 Pt comes out of room to tell RN she needs to go outside to get medications from boyfriend and give him a vape. Explained it is past visiting hours and it is against policy for patient to leave the unit/hospital prior to discharge. Patient states she needs to leave and was not open to any alternative suggestions made outside of her going outside. Stated she would like to leave AMA.  Message sent to Saranya Shankar to notify of patient's desire to leave AMA.     01:06 Message received by Saranya Shankar NP, told patient NP would like to come speak to her prior to her making decision to leave, patient agrees.    01:31 Patient comes out of room stating that there are  surrounding her boyfriend's care outside of the ER and she needs to leave immediately. Educated patient about importance of staying to receive treatment/care. Patient acknowledges but states she needs to go anyway. Patient signed AMA form and left unit. Saranya Shankar NP notified.

## 2025-06-07 NOTE — DISCHARGE SUMMARY
Discharge Diagnosis  Pneumonitis    Issues Requiring Follow-Up    AMA  Patient expressed refusal of further care and treatment.   Patient is of sound mind and body.     Discharge Meds     Your medication list        ASK your doctor about these medications        Instructions Last Dose Given Next Dose Due   albuterol 90 mcg/actuation inhaler           cloNIDine 0.1 mg tablet  Commonly known as: Catapres           cyclobenzaprine 10 mg tablet  Commonly known as: Flexeril           Excedrin Extra Strength 250-250-65 mg tablet  Generic drug: aspirin-acetaminophen-caffeine           famotidine 20 mg tablet  Commonly known as: Pepcid           gabapentin 600 mg tablet  Commonly known as: Neurontin           meloxicam 7.5 mg tablet  Commonly known as: Mobic           Ozempic 1 mg/dose (4 mg/3 mL) pen injector  Generic drug: semaglutide           polyethylene glycol 17 gram packet  Commonly known as: Glycolax, Miralax           pregabalin 150 mg capsule  Commonly known as: Lyrica                    Test Results Pending At Discharge  Pending Labs       Order Current Status    Blood Culture Preliminary result    Blood Culture Preliminary result            Last Vitals  Vitals:    06/06/25 1500 06/06/25 1935 06/06/25 2025 06/07/25 0020   BP:   131/75    BP Location:   Right arm    Patient Position:   Sitting    Pulse:   85    Resp:   18    Temp:   36.6 °C (97.9 °F)    TempSrc:   Temporal    SpO2: 90% 91% 91% 90%   Weight:       Height:           Hospital Course   Parris Pinto is a 40 y.o. female with PMH T2DM, fibromyalgia, asthma, mood disorder, and obesity who is presenting with 3-day history of sharp midsternal pleuritic chest discomfort with SOB.  Patient states she quit smoking couple days ago and switched to vaping.  Around same time patient started having nausea with vomiting and chest discomfort with shortness of breath that worsened today.  States she has been using her home inhalers more than usual.  She feels  discomfort with inspiration and hard to feel her lungs.  Denies abdominal pain or diarrhea.  Patient states her bowel movement has been harder to pass than her usual.  Labs unremarkable.  CXR shows moderate interstitial edema concerning for pneumonitis.  Patient does smoke marijuana.  She was treated with steroids and breathing treatments with little improvement and requiring supplemental O2.  Did not was treated for pneumonitis and did show some improvement did remain on O2.  Patient says was educated on quitting smoking and vaping.  Patient was also seen by psych for concerns of excess bipolar depression and PTSD.  Psych started patient on Latuda 20 mg daily.  Overnight patient told nurse she needed to leave the hospital and send with her  and left AMA.  Called patient the next day to check up on patient she stated she was doing better.  Sent amoxicillin and Latuda to pharmacy per patient request.    AMA  Patient expressed refusal of further care and treatment.   Patient is of sound mind and body.        Pertinent Physical Exam At Time of Discharge  Physical Exam    Outpatient Follow-Up  No future appointments.      Jaz Lamb, APRN-CNP

## 2025-06-08 LAB
BACTERIA BLD CULT: NORMAL
BACTERIA BLD CULT: NORMAL

## 2025-06-09 ENCOUNTER — TELEPHONE (OUTPATIENT)
Age: 40
End: 2025-06-09

## 2025-06-09 LAB
BACTERIA BLD CULT: NORMAL
BACTERIA BLD CULT: NORMAL

## 2025-06-09 NOTE — TELEPHONE ENCOUNTER
[facility]lon called back and was told that the message was sent. She stated that it is being billed for a 16 day supply but the insurance only covers for 30 days.    [phone]

## 2025-06-10 NOTE — TELEPHONE ENCOUNTER
16 tablets is the sig for 30 days, as the prevention instruction is to take 1 tablet every other day for migraine prevention.

## 2025-06-12 ENCOUNTER — TELEPHONE (OUTPATIENT)
Dept: CASE MANAGEMENT | Facility: HOSPITAL | Age: 40
End: 2025-06-12
Payer: MEDICARE

## 2025-06-12 NOTE — TELEPHONE ENCOUNTER
This is an audio only call. This audio call was conducted in the [facility].     Substance Use Navigator Specialist (MARTIN) performed an audio only follow-up call with Parris Pinto at 3:02 PM     Was SUNS able to reach patient? No    If No, did MARTIN leave a voicemail message? Yes, left call back number and asked for a call back,     Brief summary of call:    Follow-up / Next steps:     End of call time:     Duration of audio encounter:

## 2025-06-12 NOTE — TELEPHONE ENCOUNTER
Comments: Last refill 4/11/25    Last Office Visit (last PCP visit):   5/2/2024    Next Visit Date:  Future Appointments   Date Time Provider Department Center   6/20/2025 10:00 AM Michell Callahan PA-C [facility]       **If hasn't been seen in over a year OR hasn't followed up according to last diabetes/ADHD visit, make appointment for patient before sending refill to provider.    Rx requested:  Requested Prescriptions     Pending Prescriptions Disp Refills    albuterol sulfate HFA (PROVENTIL;VENTOLIN;PROAIR) 108 (90 Base) MCG/ACT inhaler 18 g 0     Sig: Inhale 2 puffs into the lungs 4 times daily as needed for Wheezing

## 2025-06-14 LAB
ATRIAL RATE: 117 BPM
ATRIAL RATE: 125 BPM
P AXIS: 3 DEGREES
P AXIS: 36 DEGREES
P OFFSET: 196 MS
P ONSET: 147 MS
PR INTERVAL: 112 MS
PR INTERVAL: 146 MS
Q ONSET: 220 MS
Q ONSET: 220 MS
QRS COUNT: 20 BEATS
QRS COUNT: 21 BEATS
QRS DURATION: 74 MS
QRS DURATION: 76 MS
QT INTERVAL: 308 MS
QT INTERVAL: 418 MS
QTC CALCULATION(BAZETT): 429 MS
QTC CALCULATION(BAZETT): 603 MS
QTC FREDERICIA: 385 MS
QTC FREDERICIA: 533 MS
R AXIS: 18 DEGREES
R AXIS: 27 DEGREES
T AXIS: 35 DEGREES
T AXIS: 44 DEGREES
T OFFSET: 374 MS
T OFFSET: 429 MS
VENTRICULAR RATE: 117 BPM
VENTRICULAR RATE: 125 BPM

## 2025-06-14 RX ORDER — ALBUTEROL SULFATE 90 UG/1
2 INHALANT RESPIRATORY (INHALATION) 4 TIMES DAILY PRN
Qty: 18 G | Refills: 3 | Status: SHIPPED | OUTPATIENT
Start: 2025-06-14

## 2025-06-20 ENCOUNTER — TELEPHONE (OUTPATIENT)
Dept: CASE MANAGEMENT | Facility: HOSPITAL | Age: 40
End: 2025-06-20
Payer: MEDICARE

## 2025-06-20 NOTE — TELEPHONE ENCOUNTER
This is an audio only call. This audio call was conducted in the [facility].     Substance Use Navigator Specialist (MARTIN) performed an audio only follow-up call with Parris Pinto at 11:32 AM     Was SUNS able to reach patient? No    If No, did MARTIN leave a voicemail message? Yes left call back number and asked for a call back    Brief summary of call:    Follow-up / Next steps:     End of call time:     Duration of audio encounter:

## 2025-07-16 DIAGNOSIS — E11.9 TYPE 2 DIABETES MELLITUS WITHOUT COMPLICATION, WITHOUT LONG-TERM CURRENT USE OF INSULIN (HCC): ICD-10-CM

## 2025-07-16 DIAGNOSIS — E88.819 INSULIN RESISTANCE: ICD-10-CM

## 2025-07-16 RX ORDER — SEMAGLUTIDE 1.34 MG/ML
1 INJECTION, SOLUTION SUBCUTANEOUS WEEKLY
Qty: 3 ML | Refills: 2 | Status: SHIPPED | OUTPATIENT
Start: 2025-07-16

## 2025-07-16 NOTE — TELEPHONE ENCOUNTER
Comments:     Last Office Visit (last PCP visit):   5/2/2024    Next Visit Date:  No future appointments.      Rx requested:  Requested Prescriptions     Pending Prescriptions Disp Refills    Semaglutide, 1 MG/DOSE, (OZEMPIC, 1 MG/DOSE,) 4 MG/3ML SOPN sc injection 3 mL 2     Sig: Inject 1 mg into the skin once a week

## 2025-08-07 DIAGNOSIS — G43.101 MIGRAINE WITH AURA AND WITH STATUS MIGRAINOSUS, NOT INTRACTABLE: ICD-10-CM

## 2025-08-07 RX ORDER — PROMETHAZINE HYDROCHLORIDE 25 MG/1
25 TABLET ORAL EVERY 6 HOURS PRN
Qty: 90 TABLET | Refills: 0 | Status: SHIPPED | OUTPATIENT
Start: 2025-08-07

## 2025-09-02 DIAGNOSIS — K59.04 CHRONIC IDIOPATHIC CONSTIPATION: ICD-10-CM

## 2025-09-02 DIAGNOSIS — G60.0 CMT (CHARCOT-MARIE-TOOTH DISEASE): ICD-10-CM

## 2025-09-02 DIAGNOSIS — M62.838 MUSCLE SPASM: ICD-10-CM

## 2025-09-02 DIAGNOSIS — G62.9 NEUROPATHY: ICD-10-CM

## 2025-09-03 RX ORDER — PREGABALIN 150 MG/1
CAPSULE ORAL
Qty: 90 CAPSULE | Refills: 2 | Status: SHIPPED | OUTPATIENT
Start: 2025-09-03 | End: 2025-11-30

## 2025-09-03 RX ORDER — MELOXICAM 7.5 MG/1
7.5 TABLET ORAL 2 TIMES DAILY PRN
Qty: 60 TABLET | Refills: 2 | Status: SHIPPED | OUTPATIENT
Start: 2025-09-03